# Patient Record
Sex: FEMALE | Race: WHITE | NOT HISPANIC OR LATINO | Employment: OTHER | URBAN - METROPOLITAN AREA
[De-identification: names, ages, dates, MRNs, and addresses within clinical notes are randomized per-mention and may not be internally consistent; named-entity substitution may affect disease eponyms.]

---

## 2023-09-13 PROBLEM — F32.A DEPRESSED: Status: ACTIVE | Noted: 2023-09-13

## 2023-09-13 PROBLEM — R26.81 UNSTEADY GAIT: Status: ACTIVE | Noted: 2023-09-13

## 2023-09-13 PROBLEM — N39.41 URGE INCONTINENCE OF URINE: Status: ACTIVE | Noted: 2023-09-13

## 2023-09-13 PROBLEM — I05.9 MITRAL VALVE DISORDER: Status: ACTIVE | Noted: 2023-09-13

## 2023-09-13 PROBLEM — G47.00 INSOMNIA: Status: ACTIVE | Noted: 2023-09-13

## 2023-09-13 PROBLEM — G47.30 SLEEP APNEA: Status: ACTIVE | Noted: 2023-09-13

## 2023-09-13 PROBLEM — F41.9 ANXIETY: Status: ACTIVE | Noted: 2023-09-13

## 2023-09-13 PROBLEM — R35.0 FREQUENCY OF MICTURITION: Status: ACTIVE | Noted: 2023-09-13

## 2023-09-13 PROBLEM — R32 INCONTINENCE: Status: ACTIVE | Noted: 2023-09-13

## 2023-09-13 PROBLEM — R76.8 POSITIVE ANA (ANTINUCLEAR ANTIBODY): Status: ACTIVE | Noted: 2023-09-13

## 2023-09-13 PROBLEM — E53.8 VITAMIN B12 DEFICIENCY: Status: ACTIVE | Noted: 2023-09-13

## 2023-09-13 PROBLEM — E11.65 HYPERGLYCEMIA DUE TO TYPE 2 DIABETES MELLITUS (MULTI): Status: ACTIVE | Noted: 2023-09-13

## 2023-09-13 PROBLEM — E78.5 HYPERLIPIDEMIA: Status: ACTIVE | Noted: 2023-09-13

## 2023-09-13 PROBLEM — I10 HYPERTENSION: Status: ACTIVE | Noted: 2023-09-13

## 2023-09-13 PROBLEM — E11.9 DIABETES MELLITUS WITHOUT COMPLICATION (MULTI): Status: ACTIVE | Noted: 2023-09-13

## 2023-09-13 PROBLEM — E03.9 HYPOTHYROID: Status: ACTIVE | Noted: 2023-09-13

## 2023-09-13 PROBLEM — N20.1 URETERAL STONE: Status: ACTIVE | Noted: 2023-09-13

## 2023-09-13 PROBLEM — N20.0 KIDNEY STONE: Status: ACTIVE | Noted: 2023-09-13

## 2023-09-13 PROBLEM — R51.9 NEW ONSET HEADACHE: Status: ACTIVE | Noted: 2023-09-13

## 2023-09-13 PROBLEM — I48.0 PAROXYSMAL ATRIAL FIBRILLATION (MULTI): Status: ACTIVE | Noted: 2023-09-13

## 2023-09-13 PROBLEM — E55.9 VITAMIN D DEFICIENCY: Status: ACTIVE | Noted: 2023-09-13

## 2023-09-13 PROBLEM — R51.9 DAILY HEADACHE: Status: ACTIVE | Noted: 2023-09-13

## 2023-09-13 RX ORDER — SOTALOL HYDROCHLORIDE 80 MG/1
1 TABLET ORAL 2 TIMES DAILY
COMMUNITY
End: 2024-04-18

## 2023-09-13 RX ORDER — ONDANSETRON 4 MG/1
1 TABLET, ORALLY DISINTEGRATING ORAL EVERY 6 HOURS PRN
COMMUNITY
End: 2023-11-30 | Stop reason: ALTCHOICE

## 2023-09-13 RX ORDER — OXYCODONE AND ACETAMINOPHEN 5; 325 MG/1; MG/1
1 TABLET ORAL EVERY 4 HOURS PRN
COMMUNITY
End: 2023-11-30 | Stop reason: ALTCHOICE

## 2023-09-13 RX ORDER — VIBEGRON 75 MG/1
1 TABLET, FILM COATED ORAL DAILY
COMMUNITY
Start: 2022-08-11

## 2023-09-13 RX ORDER — SERTRALINE HYDROCHLORIDE 50 MG/1
1 TABLET, FILM COATED ORAL DAILY
COMMUNITY

## 2023-09-13 RX ORDER — BUPROPION HYDROCHLORIDE 150 MG/1
1 TABLET ORAL EVERY MORNING
COMMUNITY
Start: 2017-10-25

## 2023-09-13 RX ORDER — ERGOCALCIFEROL 1.25 MG/1
1 CAPSULE ORAL
COMMUNITY
Start: 2019-04-30

## 2023-09-13 RX ORDER — TRAZODONE HYDROCHLORIDE 100 MG/1
TABLET ORAL
COMMUNITY
End: 2024-02-14

## 2023-09-13 RX ORDER — LEVOTHYROXINE SODIUM 75 UG/1
75 TABLET ORAL DAILY
COMMUNITY
End: 2023-11-30 | Stop reason: SDUPTHER

## 2023-09-13 RX ORDER — MELOXICAM 7.5 MG/1
1 TABLET ORAL DAILY
COMMUNITY

## 2023-09-13 RX ORDER — KETOCONAZOLE 20 MG/G
CREAM TOPICAL
COMMUNITY
Start: 2023-01-03

## 2023-09-13 RX ORDER — WARFARIN 2.5 MG/1
TABLET ORAL
COMMUNITY

## 2023-09-13 RX ORDER — LOSARTAN POTASSIUM 50 MG/1
1 TABLET ORAL DAILY
COMMUNITY
Start: 2023-02-14 | End: 2024-03-07

## 2023-09-13 RX ORDER — LEVOTHYROXINE SODIUM 50 UG/1
1 TABLET ORAL
COMMUNITY
End: 2023-11-30

## 2023-09-13 RX ORDER — IBUPROFEN 600 MG/1
1 TABLET ORAL EVERY 6 HOURS PRN
COMMUNITY
End: 2024-03-13

## 2023-09-13 RX ORDER — GLIPIZIDE 2.5 MG/1
1 TABLET, EXTENDED RELEASE ORAL
COMMUNITY
Start: 2021-03-23 | End: 2023-11-30 | Stop reason: SDUPTHER

## 2023-09-13 RX ORDER — FLUTICASONE PROPIONATE 50 MCG
1 SPRAY, SUSPENSION (ML) NASAL DAILY
COMMUNITY
Start: 2021-09-14 | End: 2024-05-17 | Stop reason: SDUPTHER

## 2023-09-13 RX ORDER — LANCETS
EACH MISCELLANEOUS 2 TIMES DAILY
COMMUNITY
Start: 2019-04-01

## 2023-09-13 RX ORDER — LOSARTAN POTASSIUM 25 MG/1
1 TABLET ORAL DAILY
COMMUNITY
Start: 2021-07-08 | End: 2023-11-30

## 2023-09-13 RX ORDER — LANOLIN ALCOHOL/MO/W.PET/CERES
1 CREAM (GRAM) TOPICAL DAILY
COMMUNITY

## 2023-09-13 RX ORDER — WARFARIN SODIUM 5 MG/1
1 TABLET ORAL DAILY
COMMUNITY

## 2023-10-05 ENCOUNTER — ANTICOAGULATION - WARFARIN VISIT (OUTPATIENT)
Dept: CARDIOLOGY | Facility: CLINIC | Age: 73
End: 2023-10-05
Payer: MEDICARE

## 2023-10-05 DIAGNOSIS — T50.901A ACCIDENTAL MEDICATION ERROR, INITIAL ENCOUNTER: ICD-10-CM

## 2023-10-05 LAB
INR IN PPP BY COAGULATION ASSAY EXTERNAL: 2.2 (ref 2–3)
PROTHROMBIN TIME (PT) IN PPP BY COAGULATION ASSAY EXTERNAL: ABNORMAL SECONDS

## 2023-11-02 ENCOUNTER — APPOINTMENT (OUTPATIENT)
Dept: PRIMARY CARE | Facility: CLINIC | Age: 73
End: 2023-11-02
Payer: MEDICARE

## 2023-11-02 ENCOUNTER — TELEPHONE (OUTPATIENT)
Dept: CARDIOLOGY | Facility: CLINIC | Age: 73
End: 2023-11-02
Payer: MEDICARE

## 2023-11-02 ENCOUNTER — ANTICOAGULATION - WARFARIN VISIT (OUTPATIENT)
Dept: CARDIOLOGY | Facility: HOSPITAL | Age: 73
End: 2023-11-02
Payer: MEDICARE

## 2023-11-02 NOTE — TELEPHONE ENCOUNTER
Patient called asking if she should continue coumadin dose at 5mg for Sun, Tue, Wed, Fri, Sat and 2.5mg Mon and thurs.

## 2023-11-15 ENCOUNTER — TELEPHONE (OUTPATIENT)
Dept: CARDIOLOGY | Facility: CLINIC | Age: 73
End: 2023-11-15

## 2023-11-15 ENCOUNTER — ANTICOAGULATION - WARFARIN VISIT (OUTPATIENT)
Dept: CARDIOLOGY | Facility: CLINIC | Age: 73
End: 2023-11-15
Payer: MEDICARE

## 2023-11-15 DIAGNOSIS — I48.0 PAROXYSMAL ATRIAL FIBRILLATION (MULTI): ICD-10-CM

## 2023-11-15 LAB
POC INR: 2.4
POC PROTHROMBIN TIME: NORMAL

## 2023-11-16 NOTE — TELEPHONE ENCOUNTER
Patient called to find out her instructions for warfarin. Advised no change, recheck in 2 weeks with home monitor.

## 2023-11-30 ENCOUNTER — ANTICOAGULATION - WARFARIN VISIT (OUTPATIENT)
Dept: CARDIOLOGY | Facility: CLINIC | Age: 73
End: 2023-11-30
Payer: MEDICARE

## 2023-11-30 ENCOUNTER — OFFICE VISIT (OUTPATIENT)
Dept: PRIMARY CARE | Facility: CLINIC | Age: 73
End: 2023-11-30
Payer: MEDICARE

## 2023-11-30 VITALS
DIASTOLIC BLOOD PRESSURE: 78 MMHG | OXYGEN SATURATION: 97 % | TEMPERATURE: 97.7 F | BODY MASS INDEX: 29.77 KG/M2 | RESPIRATION RATE: 16 BRPM | WEIGHT: 168 LBS | HEIGHT: 63 IN | SYSTOLIC BLOOD PRESSURE: 124 MMHG | HEART RATE: 59 BPM

## 2023-11-30 DIAGNOSIS — E55.9 VITAMIN D DEFICIENCY: ICD-10-CM

## 2023-11-30 DIAGNOSIS — E11.9 DIABETES MELLITUS WITHOUT COMPLICATION (MULTI): ICD-10-CM

## 2023-11-30 DIAGNOSIS — Z00.00 WELL ADULT EXAM: Primary | ICD-10-CM

## 2023-11-30 DIAGNOSIS — E11.65 TYPE 2 DIABETES MELLITUS WITH HYPERGLYCEMIA, WITHOUT LONG-TERM CURRENT USE OF INSULIN (MULTI): ICD-10-CM

## 2023-11-30 DIAGNOSIS — E03.9 HYPOTHYROIDISM, UNSPECIFIED TYPE: ICD-10-CM

## 2023-11-30 DIAGNOSIS — I48.0 PAROXYSMAL ATRIAL FIBRILLATION (MULTI): ICD-10-CM

## 2023-11-30 DIAGNOSIS — I10 HYPERTENSION, UNSPECIFIED TYPE: ICD-10-CM

## 2023-11-30 DIAGNOSIS — R53.83 OTHER FATIGUE: ICD-10-CM

## 2023-11-30 DIAGNOSIS — L85.3 DRY SKIN: ICD-10-CM

## 2023-11-30 DIAGNOSIS — Z13.220 SCREENING FOR LIPID DISORDERS: ICD-10-CM

## 2023-11-30 DIAGNOSIS — G62.9 NEUROPATHY: ICD-10-CM

## 2023-11-30 LAB
25(OH)D3 SERPL-MCNC: 67 NG/ML (ref 31–100)
ALBUMIN SERPL-MCNC: 4.2 G/DL (ref 3.5–5)
ALP BLD-CCNC: 94 U/L (ref 35–125)
ALT SERPL-CCNC: 10 U/L (ref 5–40)
ANION GAP SERPL CALC-SCNC: 10 MMOL/L
AST SERPL-CCNC: 14 U/L (ref 5–40)
BASOPHILS # BLD AUTO: 0.03 X10*3/UL (ref 0–0.1)
BASOPHILS NFR BLD AUTO: 0.5 %
BILIRUB SERPL-MCNC: 0.4 MG/DL (ref 0.1–1.2)
BUN SERPL-MCNC: 16 MG/DL (ref 8–25)
CALCIUM SERPL-MCNC: 8.9 MG/DL (ref 8.5–10.4)
CHLORIDE SERPL-SCNC: 100 MMOL/L (ref 97–107)
CHOLEST SERPL-MCNC: 218 MG/DL (ref 133–200)
CHOLEST/HDLC SERPL: 2.7 {RATIO}
CO2 SERPL-SCNC: 28 MMOL/L (ref 24–31)
CREAT SERPL-MCNC: 0.8 MG/DL (ref 0.4–1.6)
EOSINOPHIL # BLD AUTO: 0.08 X10*3/UL (ref 0–0.4)
EOSINOPHIL NFR BLD AUTO: 1.4 %
ERYTHROCYTE [DISTWIDTH] IN BLOOD BY AUTOMATED COUNT: 14.3 % (ref 11.5–14.5)
GFR SERPL CREATININE-BSD FRML MDRD: 77 ML/MIN/1.73M*2
GLUCOSE SERPL-MCNC: 94 MG/DL (ref 65–99)
HCT VFR BLD AUTO: 40.5 % (ref 36–46)
HDLC SERPL-MCNC: 80 MG/DL
HGB BLD-MCNC: 13.1 G/DL (ref 12–16)
IMM GRANULOCYTES # BLD AUTO: 0.01 X10*3/UL (ref 0–0.5)
IMM GRANULOCYTES NFR BLD AUTO: 0.2 % (ref 0–0.9)
LDLC SERPL CALC-MCNC: 114 MG/DL (ref 65–130)
LYMPHOCYTES # BLD AUTO: 1.56 X10*3/UL (ref 0.8–3)
LYMPHOCYTES NFR BLD AUTO: 27.5 %
MCH RBC QN AUTO: 30.5 PG (ref 26–34)
MCHC RBC AUTO-ENTMCNC: 32.3 G/DL (ref 32–36)
MCV RBC AUTO: 94 FL (ref 80–100)
MONOCYTES # BLD AUTO: 0.31 X10*3/UL (ref 0.05–0.8)
MONOCYTES NFR BLD AUTO: 5.5 %
NEUTROPHILS # BLD AUTO: 3.68 X10*3/UL (ref 1.6–5.5)
NEUTROPHILS NFR BLD AUTO: 64.9 %
NRBC BLD-RTO: 0 /100 WBCS (ref 0–0)
PLATELET # BLD AUTO: 308 X10*3/UL (ref 150–450)
POC HEMOGLOBIN A1C: 6.1 % (ref 4.2–6.5)
POC INR: 2
POC PROTHROMBIN TIME: NORMAL
POTASSIUM SERPL-SCNC: 4.3 MMOL/L (ref 3.4–5.1)
PROT SERPL-MCNC: 6.7 G/DL (ref 5.9–7.9)
RBC # BLD AUTO: 4.29 X10*6/UL (ref 4–5.2)
SODIUM SERPL-SCNC: 138 MMOL/L (ref 133–145)
TRIGL SERPL-MCNC: 121 MG/DL (ref 40–150)
TSH SERPL DL<=0.05 MIU/L-ACNC: 1.78 MIU/L (ref 0.27–4.2)
WBC # BLD AUTO: 5.7 X10*3/UL (ref 4.4–11.3)

## 2023-11-30 PROCEDURE — 99397 PER PM REEVAL EST PAT 65+ YR: CPT | Performed by: NURSE PRACTITIONER

## 2023-11-30 PROCEDURE — 82306 VITAMIN D 25 HYDROXY: CPT | Performed by: NURSE PRACTITIONER

## 2023-11-30 PROCEDURE — 1125F AMNT PAIN NOTED PAIN PRSNT: CPT | Performed by: NURSE PRACTITIONER

## 2023-11-30 PROCEDURE — 4010F ACE/ARB THERAPY RXD/TAKEN: CPT | Performed by: NURSE PRACTITIONER

## 2023-11-30 PROCEDURE — 1159F MED LIST DOCD IN RCRD: CPT | Performed by: NURSE PRACTITIONER

## 2023-11-30 PROCEDURE — 80061 LIPID PANEL: CPT | Performed by: NURSE PRACTITIONER

## 2023-11-30 PROCEDURE — 85025 COMPLETE CBC W/AUTO DIFF WBC: CPT | Performed by: NURSE PRACTITIONER

## 2023-11-30 PROCEDURE — 84443 ASSAY THYROID STIM HORMONE: CPT | Performed by: NURSE PRACTITIONER

## 2023-11-30 PROCEDURE — 80053 COMPREHEN METABOLIC PANEL: CPT | Performed by: NURSE PRACTITIONER

## 2023-11-30 PROCEDURE — 3074F SYST BP LT 130 MM HG: CPT | Performed by: NURSE PRACTITIONER

## 2023-11-30 PROCEDURE — 36415 COLL VENOUS BLD VENIPUNCTURE: CPT | Performed by: NURSE PRACTITIONER

## 2023-11-30 PROCEDURE — 3078F DIAST BP <80 MM HG: CPT | Performed by: NURSE PRACTITIONER

## 2023-11-30 PROCEDURE — 1036F TOBACCO NON-USER: CPT | Performed by: NURSE PRACTITIONER

## 2023-11-30 RX ORDER — LEVOTHYROXINE SODIUM 75 UG/1
75 TABLET ORAL DAILY
Qty: 90 TABLET | Refills: 1 | Status: SHIPPED | OUTPATIENT
Start: 2023-11-30 | End: 2024-05-23

## 2023-11-30 RX ORDER — GLIPIZIDE 2.5 MG/1
2.5 TABLET, EXTENDED RELEASE ORAL
Qty: 90 TABLET | Refills: 1 | Status: SHIPPED | OUTPATIENT
Start: 2023-11-30

## 2023-11-30 RX ORDER — AMMONIUM LACTATE 12 G/100G
LOTION TOPICAL AS NEEDED
Qty: 225 G | Refills: 1 | Status: SHIPPED | OUTPATIENT
Start: 2023-11-30 | End: 2024-11-29

## 2023-11-30 RX ORDER — BLOOD SUGAR DIAGNOSTIC
180 STRIP MISCELLANEOUS 2 TIMES DAILY
Qty: 180 STRIP | Refills: 3 | Status: SHIPPED | OUTPATIENT
Start: 2023-11-30

## 2023-11-30 RX ORDER — BLOOD SUGAR DIAGNOSTIC
STRIP MISCELLANEOUS 2 TIMES DAILY
COMMUNITY
End: 2023-11-30 | Stop reason: SDUPTHER

## 2023-11-30 ASSESSMENT — PATIENT HEALTH QUESTIONNAIRE - PHQ9
SUM OF ALL RESPONSES TO PHQ9 QUESTIONS 1 AND 2: 0
2. FEELING DOWN, DEPRESSED OR HOPELESS: NOT AT ALL
1. LITTLE INTEREST OR PLEASURE IN DOING THINGS: NOT AT ALL

## 2023-11-30 ASSESSMENT — PAIN SCALES - GENERAL: PAINLEVEL: 2

## 2023-11-30 NOTE — PATIENT INSTRUCTIONS
Discussed new onst of neuropathy not daily intermittent sai call if worsening . Discussed treatment  decline at this time dm wel controlled

## 2023-11-30 NOTE — PROGRESS NOTES
"Subjective   Patient ID: Sheron Meyers is a 73 y.o. female who presents for No chief complaint on file.. Here for a cpe and dm vist    Here for a cp  . And dm vist. Has had cataracts done,  using CPAP has some facial swelling due to mask. Overall doing well         Review of Systems   All other systems reviewed and are negative.      Objective   /78   Pulse 59   Temp 36.5 °C (97.7 °F)   Resp 16   Ht 1.588 m (5' 2.5\")   Wt 76.2 kg (168 lb)   SpO2 97%   BMI 30.24 kg/m²     Physical Exam  Constitutional:       Appearance: Normal appearance. She is normal weight.   HENT:      Head: Normocephalic.      Right Ear: Tympanic membrane normal.      Left Ear: Tympanic membrane normal.      Nose: Nose normal.   Cardiovascular:      Rate and Rhythm: Normal rate and regular rhythm.      Pulses:           Dorsalis pedis pulses are 1+ on the right side and 1+ on the left side.      Heart sounds: Normal heart sounds.   Pulmonary:      Effort: Pulmonary effort is normal.   Genitourinary:     Comments: Breast exam nl  Musculoskeletal:         General: Normal range of motion.      Cervical back: Normal range of motion.   Feet:      Right foot:      Skin integrity: Skin integrity normal.      Toenail Condition: Right toenails are normal.      Left foot:      Skin integrity: Skin integrity normal.      Toenail Condition: Left toenails are normal.   Skin:     General: Skin is dry.      Comments: Obvious scratching   Neurological:      Mental Status: She is alert.      Comments: Has some new symptomms of walking on needles on feet new onset   Psychiatric:         Mood and Affect: Mood normal.         Assessment/Plan   Problem List Items Addressed This Visit             ICD-10-CM    Diabetes mellitus without complication (CMS/HCC) E11.9    Relevant Orders    POCT glycosylated hemoglobin (Hb A1C) manually resulted (Completed)    Hyperglycemia due to type 2 diabetes mellitus (CMS/HCC) E11.65     Other Visit Diagnoses         " Codes    Well adult exam    -  Primary Z00.00

## 2023-12-13 ENCOUNTER — ANTICOAGULATION - WARFARIN VISIT (OUTPATIENT)
Dept: CARDIOLOGY | Facility: CLINIC | Age: 73
End: 2023-12-13
Payer: MEDICARE

## 2023-12-13 DIAGNOSIS — I48.0 PAROXYSMAL ATRIAL FIBRILLATION (MULTI): ICD-10-CM

## 2023-12-13 LAB
POC INR: 2.6
POC PROTHROMBIN TIME: NORMAL

## 2024-01-12 ENCOUNTER — TELEPHONE (OUTPATIENT)
Dept: CARDIOLOGY | Facility: CLINIC | Age: 74
End: 2024-01-12
Payer: MEDICARE

## 2024-01-24 ENCOUNTER — TELEPHONE (OUTPATIENT)
Dept: CARDIOLOGY | Facility: CLINIC | Age: 74
End: 2024-01-24
Payer: MEDICARE

## 2024-02-06 ENCOUNTER — ANTICOAGULATION - WARFARIN VISIT (OUTPATIENT)
Dept: CARDIOLOGY | Facility: CLINIC | Age: 74
End: 2024-02-06
Payer: MEDICARE

## 2024-02-06 DIAGNOSIS — I48.0 PAROXYSMAL ATRIAL FIBRILLATION (MULTI): Primary | ICD-10-CM

## 2024-02-06 LAB
POC INR: 2
POC PROTHROMBIN TIME: NORMAL

## 2024-02-06 PROCEDURE — 85610 PROTHROMBIN TIME: CPT | Mod: QW | Performed by: INTERNAL MEDICINE

## 2024-02-06 PROCEDURE — 85610 PROTHROMBIN TIME: CPT | Performed by: INTERNAL MEDICINE

## 2024-02-07 ENCOUNTER — TELEPHONE (OUTPATIENT)
Dept: CARDIOLOGY | Facility: CLINIC | Age: 74
End: 2024-02-07
Payer: MEDICARE

## 2024-02-12 DIAGNOSIS — F32.A DEPRESSION, UNSPECIFIED DEPRESSION TYPE: ICD-10-CM

## 2024-02-14 RX ORDER — TRAZODONE HYDROCHLORIDE 100 MG/1
100 TABLET ORAL DAILY
Qty: 90 TABLET | Refills: 0 | Status: SHIPPED | OUTPATIENT
Start: 2024-02-14 | End: 2024-05-23

## 2024-02-21 ENCOUNTER — TELEPHONE (OUTPATIENT)
Dept: CARDIOLOGY | Facility: CLINIC | Age: 74
End: 2024-02-21
Payer: MEDICARE

## 2024-03-06 ENCOUNTER — TELEPHONE (OUTPATIENT)
Dept: CARDIOLOGY | Facility: CLINIC | Age: 74
End: 2024-03-06
Payer: MEDICARE

## 2024-03-07 DIAGNOSIS — I10 HYPERTENSION, UNSPECIFIED TYPE: ICD-10-CM

## 2024-03-07 RX ORDER — LOSARTAN POTASSIUM 50 MG/1
50 TABLET ORAL DAILY
Qty: 90 TABLET | Refills: 3 | Status: SHIPPED | OUTPATIENT
Start: 2024-03-07 | End: 2024-03-13 | Stop reason: DRUGHIGH

## 2024-03-13 ENCOUNTER — OFFICE VISIT (OUTPATIENT)
Dept: CARDIOLOGY | Facility: CLINIC | Age: 74
End: 2024-03-13
Payer: MEDICARE

## 2024-03-13 VITALS — SYSTOLIC BLOOD PRESSURE: 168 MMHG | DIASTOLIC BLOOD PRESSURE: 94 MMHG | HEART RATE: 68 BPM

## 2024-03-13 DIAGNOSIS — E78.2 MIXED HYPERLIPIDEMIA: ICD-10-CM

## 2024-03-13 DIAGNOSIS — I48.0 PAROXYSMAL ATRIAL FIBRILLATION (MULTI): Primary | ICD-10-CM

## 2024-03-13 DIAGNOSIS — I10 PRIMARY HYPERTENSION: ICD-10-CM

## 2024-03-13 PROCEDURE — 3080F DIAST BP >= 90 MM HG: CPT | Performed by: INTERNAL MEDICINE

## 2024-03-13 PROCEDURE — 1159F MED LIST DOCD IN RCRD: CPT | Performed by: INTERNAL MEDICINE

## 2024-03-13 PROCEDURE — 3077F SYST BP >= 140 MM HG: CPT | Performed by: INTERNAL MEDICINE

## 2024-03-13 PROCEDURE — 99214 OFFICE O/P EST MOD 30 MIN: CPT | Performed by: INTERNAL MEDICINE

## 2024-03-13 PROCEDURE — 1036F TOBACCO NON-USER: CPT | Performed by: INTERNAL MEDICINE

## 2024-03-13 PROCEDURE — 4010F ACE/ARB THERAPY RXD/TAKEN: CPT | Performed by: INTERNAL MEDICINE

## 2024-03-13 PROCEDURE — 1125F AMNT PAIN NOTED PAIN PRSNT: CPT | Performed by: INTERNAL MEDICINE

## 2024-03-13 RX ORDER — LOSARTAN POTASSIUM 100 MG/1
100 TABLET ORAL DAILY
Qty: 90 TABLET | Refills: 3 | Status: SHIPPED | OUTPATIENT
Start: 2024-03-13 | End: 2025-03-13

## 2024-03-13 ASSESSMENT — PAIN SCALES - GENERAL: PAINLEVEL: 3

## 2024-03-13 ASSESSMENT — ENCOUNTER SYMPTOMS
PALPITATIONS: 1
SHORTNESS OF BREATH: 0
NUMBNESS: 0
HEMATURIA: 0
BLURRED VISION: 0
DYSURIA: 0
DYSPNEA ON EXERTION: 0
COUGH: 0
ABDOMINAL PAIN: 0
PARESTHESIAS: 0

## 2024-03-13 NOTE — PROGRESS NOTES
Subjective   Sheron Meyers is a 74 y.o. female.    Chief Complaint:  Atrial Fibrillation (6 month f/u)    Atrial Fibrillation  Symptoms include palpitations. Symptoms are negative for chest pain and shortness of breath. Past medical history includes atrial fibrillation.   Started CPAP in October and palpitations have increased at night.  Not during the day.      Review of Systems   Constitutional: Negative for malaise/fatigue.   HENT:  Negative for congestion.    Eyes:  Negative for blurred vision.   Cardiovascular:  Positive for palpitations. Negative for chest pain and dyspnea on exertion.   Respiratory:  Negative for cough and shortness of breath.    Musculoskeletal:  Negative for joint pain.   Gastrointestinal:  Negative for abdominal pain.   Genitourinary:  Negative for dysuria and hematuria.   Neurological:  Negative for numbness and paresthesias.       Objective   Constitutional:       Appearance: Not in distress.   Eyes:      Conjunctiva/sclera: Conjunctivae normal.   Neck:      Vascular: JVD normal.   Pulmonary:      Breath sounds: Normal breath sounds. No wheezing. No rhonchi. No rales.   Cardiovascular:      Normal rate. Regular rhythm.      Murmurs: There is no murmur.      No gallop.  No click. No rub.   Abdominal:      Palpations: Abdomen is soft.   Neurological:      General: No focal deficit present.      Mental Status: Alert.         Lab Review:   Anticoagulation - Warfarin Visit on 02/06/2024   Component Date Value    POC INR 02/06/2024 2.00        Assessment/Plan   The primary encounter diagnosis was Paroxysmal atrial fibrillation (CMS/HCC). Diagnoses of Primary hypertension and Mixed hyperlipidemia were also pertinent to this visit.    Hyperlipidemia  Yenni Delgado has followed lipids, last panel: Tchol 218  HDL 80     Paroxysmal atrial fibrillation (CMS/HCC)  Sotalol for suppression of afib and warfarin for stroke prevention, we follow the INRs.  Palpitations have increased during  the evening hours when she is using her CPAP.  The question will be what is her heart rhythm when she feels the palpitations.  Will order to a patch monitor to see if we can determine what the cardiac rhythm is at the time of her symptoms.    Hypertension  BP elevated will increase the losartan to 100  mg.  Home monitor relatively accurate, will have her keep a home BP log and bring with her on return.

## 2024-03-13 NOTE — ASSESSMENT & PLAN NOTE
BP elevated will increase the losartan to 100  mg.  Home monitor relatively accurate, will have her keep a home BP log and bring with her on return.

## 2024-03-13 NOTE — ASSESSMENT & PLAN NOTE
Sotalol for suppression of afib and warfarin for stroke prevention, we follow the INRs.  Palpitations have increased during the evening hours when she is using her CPAP.  The question will be what is her heart rhythm when she feels the palpitations.  Will order to a patch monitor to see if we can determine what the cardiac rhythm is at the time of her symptoms.

## 2024-03-20 ENCOUNTER — TELEPHONE (OUTPATIENT)
Dept: CARDIOLOGY | Facility: CLINIC | Age: 74
End: 2024-03-20
Payer: MEDICARE

## 2024-04-04 ENCOUNTER — TELEPHONE (OUTPATIENT)
Dept: CARDIOLOGY | Facility: CLINIC | Age: 74
End: 2024-04-04

## 2024-04-04 ENCOUNTER — ANTICOAGULATION - WARFARIN VISIT (OUTPATIENT)
Dept: CARDIOLOGY | Facility: CLINIC | Age: 74
End: 2024-04-04
Payer: MEDICARE

## 2024-04-04 DIAGNOSIS — I48.0 PAROXYSMAL ATRIAL FIBRILLATION (MULTI): Primary | ICD-10-CM

## 2024-04-04 LAB
POC INR: 3
POC PROTHROMBIN TIME: NORMAL

## 2024-04-04 PROCEDURE — 85610 PROTHROMBIN TIME: CPT | Performed by: INTERNAL MEDICINE

## 2024-04-04 PROCEDURE — 85610 PROTHROMBIN TIME: CPT | Mod: QW | Performed by: INTERNAL MEDICINE

## 2024-04-04 NOTE — PROGRESS NOTES
INR 3.0 will have patient hold coumadin 1 day then resume 7.5 mg M-TH 5 mg other days.  Recheck 2 weeks

## 2024-04-18 DIAGNOSIS — I48.0 PAROXYSMAL ATRIAL FIBRILLATION (MULTI): Primary | ICD-10-CM

## 2024-04-18 RX ORDER — SOTALOL HYDROCHLORIDE 80 MG/1
80 TABLET ORAL 2 TIMES DAILY
Qty: 180 TABLET | Refills: 0 | Status: SHIPPED | OUTPATIENT
Start: 2024-04-18

## 2024-04-22 ENCOUNTER — TELEPHONE (OUTPATIENT)
Dept: CARDIOLOGY | Facility: CLINIC | Age: 74
End: 2024-04-22

## 2024-04-26 NOTE — ASSESSMENT & PLAN NOTE
Yenni Delgado checked lipids in November: Tchol 218  HDL 80 .  Reasonable goal numbers for the patient's risk profile, follow over time.

## 2024-04-26 NOTE — ASSESSMENT & PLAN NOTE
BP mildly elevated on last visit and we increased the Losartan to 100 mg and I asked her to keep home BP log.  Home blood pressure overall mildly elevated.  I will add hydrochlorothiazide 25 mg once daily to her losartan.  Check a basic metabolic panel in approximately 4 weeks call if necessary.

## 2024-04-26 NOTE — ASSESSMENT & PLAN NOTE
Patient was experiencing palpitations on last visit and we ordered patch monitor.  Sotalol had been relatively effective up to this point.  Did not see any atrial fibrillation on the recent Zio patch monitor.  Continue warfarin for stroke prevention and sotalol to suppress the atrial fibrillation.  Will follow  The INRs

## 2024-04-30 ENCOUNTER — OFFICE VISIT (OUTPATIENT)
Dept: CARDIOLOGY | Facility: CLINIC | Age: 74
End: 2024-04-30
Payer: MEDICARE

## 2024-04-30 VITALS
HEART RATE: 64 BPM | BODY MASS INDEX: 31.32 KG/M2 | WEIGHT: 174 LBS | DIASTOLIC BLOOD PRESSURE: 80 MMHG | SYSTOLIC BLOOD PRESSURE: 142 MMHG

## 2024-04-30 DIAGNOSIS — I48.0 PAROXYSMAL ATRIAL FIBRILLATION (MULTI): Primary | ICD-10-CM

## 2024-04-30 DIAGNOSIS — E11.9 DIABETES MELLITUS WITHOUT COMPLICATION (MULTI): Primary | ICD-10-CM

## 2024-04-30 DIAGNOSIS — I47.10 SVT (SUPRAVENTRICULAR TACHYCARDIA) (CMS-HCC): ICD-10-CM

## 2024-04-30 DIAGNOSIS — E78.2 MIXED HYPERLIPIDEMIA: ICD-10-CM

## 2024-04-30 DIAGNOSIS — I10 PRIMARY HYPERTENSION: ICD-10-CM

## 2024-04-30 PROCEDURE — 99214 OFFICE O/P EST MOD 30 MIN: CPT | Performed by: INTERNAL MEDICINE

## 2024-04-30 PROCEDURE — 3079F DIAST BP 80-89 MM HG: CPT | Performed by: INTERNAL MEDICINE

## 2024-04-30 PROCEDURE — 93005 ELECTROCARDIOGRAM TRACING: CPT | Performed by: INTERNAL MEDICINE

## 2024-04-30 PROCEDURE — 3077F SYST BP >= 140 MM HG: CPT | Performed by: INTERNAL MEDICINE

## 2024-04-30 PROCEDURE — 4010F ACE/ARB THERAPY RXD/TAKEN: CPT | Performed by: INTERNAL MEDICINE

## 2024-04-30 PROCEDURE — 1126F AMNT PAIN NOTED NONE PRSNT: CPT | Performed by: INTERNAL MEDICINE

## 2024-04-30 PROCEDURE — 93010 ELECTROCARDIOGRAM REPORT: CPT | Performed by: INTERNAL MEDICINE

## 2024-04-30 PROCEDURE — 1159F MED LIST DOCD IN RCRD: CPT | Performed by: INTERNAL MEDICINE

## 2024-04-30 PROCEDURE — 1036F TOBACCO NON-USER: CPT | Performed by: INTERNAL MEDICINE

## 2024-04-30 RX ORDER — ATORVASTATIN CALCIUM 20 MG/1
20 TABLET, FILM COATED ORAL DAILY
Qty: 100 TABLET | Refills: 3 | Status: SHIPPED | OUTPATIENT
Start: 2024-04-30 | End: 2025-06-04

## 2024-04-30 RX ORDER — HYDROCHLOROTHIAZIDE 25 MG/1
25 TABLET ORAL DAILY
Qty: 90 TABLET | Refills: 3 | Status: SHIPPED | OUTPATIENT
Start: 2024-04-30 | End: 2025-04-30

## 2024-04-30 ASSESSMENT — ENCOUNTER SYMPTOMS
COUGH: 0
LOSS OF SENSATION IN FEET: 0
PARESTHESIAS: 0
ABDOMINAL PAIN: 0
DYSURIA: 0
OCCASIONAL FEELINGS OF UNSTEADINESS: 1
BLURRED VISION: 0
PALPITATIONS: 1
DEPRESSION: 0
HEMATURIA: 0
NUMBNESS: 0
DYSPNEA ON EXERTION: 0
SHORTNESS OF BREATH: 0

## 2024-04-30 ASSESSMENT — PATIENT HEALTH QUESTIONNAIRE - PHQ9
SUM OF ALL RESPONSES TO PHQ9 QUESTIONS 1 AND 2: 0
1. LITTLE INTEREST OR PLEASURE IN DOING THINGS: NOT AT ALL
2. FEELING DOWN, DEPRESSED OR HOPELESS: NOT AT ALL

## 2024-04-30 ASSESSMENT — PAIN SCALES - GENERAL: PAINLEVEL: 0-NO PAIN

## 2024-04-30 NOTE — ASSESSMENT & PLAN NOTE
Short bursts of SVT noted on Zio patch monitor.  We discussed various treatment options including increasing dose of sotalol, alternative antiarrhythmic, referral to Dr. Souza for bleeding medications the same.  QTc interval was reasonable at 424 ms resting heart rate is only 60 bpm.  Will thus make no changes at this time.  If palpitations persist or become more prominent will likely refer to Dr. Souza for imaging.

## 2024-04-30 NOTE — PROGRESS NOTES
Subjective   Sheron Meyers is a 74 y.o. female.    Chief Complaint:  Follow-up (Monitor results)    HPI  The patient states that she still has the palpitations.  They are not prolonged and usually brief but she still feels.    Review of Systems   Constitutional: Negative for malaise/fatigue.   HENT:  Negative for congestion.    Eyes:  Negative for blurred vision.   Cardiovascular:  Positive for palpitations. Negative for chest pain and dyspnea on exertion.   Respiratory:  Negative for cough and shortness of breath.    Musculoskeletal:  Negative for joint pain.   Gastrointestinal:  Negative for abdominal pain.   Genitourinary:  Negative for dysuria and hematuria.   Neurological:  Negative for numbness and paresthesias.       Objective   Constitutional:       Appearance: Not in distress.   Eyes:      Conjunctiva/sclera: Conjunctivae normal.   Neck:      Vascular: JVD normal.   Pulmonary:      Breath sounds: Normal breath sounds. No wheezing. No rhonchi. No rales.   Cardiovascular:      Normal rate. Regular rhythm.      Murmurs: There is no murmur.      No gallop.  No click. No rub.   Abdominal:      Palpations: Abdomen is soft.   Neurological:      General: No focal deficit present.      Mental Status: Alert.         Lab Review:       Assessment/Plan   The primary encounter diagnosis was Paroxysmal atrial fibrillation (Multi). Diagnoses of Primary hypertension, Mixed hyperlipidemia, and SVT (supraventricular tachycardia) (CMS-HCC) were also pertinent to this visit.  Hyperlipidemia  Yenni Delgado checked lipids in November: Tchol 218  HDL 80 .  Reasonable goal numbers for the patient's risk profile, follow over time.    Hypertension  BP mildly elevated on last visit and we increased the Losartan to 100 mg and I asked her to keep home BP log.  Home blood pressure overall mildly elevated.  I will add hydrochlorothiazide 25 mg once daily to her losartan.  Check a basic metabolic panel in approximately 4 weeks  call if necessary.    Paroxysmal atrial fibrillation (Multi)  Patient was experiencing palpitations on last visit and we ordered patch monitor.  Sotalol had been relatively effective up to this point.  Did not see any atrial fibrillation on the recent Zio patch monitor.  Continue warfarin for stroke prevention and sotalol to suppress the atrial fibrillation.  Will follow  The INRs    SVT (supraventricular tachycardia) (CMS-HCC)  Short bursts of SVT noted on Zio patch monitor.  We discussed various treatment options including increasing dose of sotalol, alternative antiarrhythmic, referral to Dr. Souza for bleeding medications the same.  QTc interval was reasonable at 424 ms resting heart rate is only 60 bpm.  Will thus make no changes at this time.  If palpitations persist or become more prominent will likely refer to Dr. Souza for imaging.

## 2024-05-01 ENCOUNTER — ANTICOAGULATION - WARFARIN VISIT (OUTPATIENT)
Dept: CARDIOLOGY | Facility: CLINIC | Age: 74
End: 2024-05-01
Payer: MEDICARE

## 2024-05-01 DIAGNOSIS — I48.0 PAROXYSMAL ATRIAL FIBRILLATION (MULTI): ICD-10-CM

## 2024-05-01 LAB
POC INR: 2.9
POC PROTHROMBIN TIME: NORMAL

## 2024-05-01 NOTE — PROGRESS NOTES
Fax received - pt's INR today was 2.9. Called pt - currently taking 2.5 mg on M, W, F and 5 mg all other days. [phone]

## 2024-05-16 ENCOUNTER — TELEPHONE (OUTPATIENT)
Dept: CARDIOLOGY | Facility: CLINIC | Age: 74
End: 2024-05-16

## 2024-05-16 ENCOUNTER — TELEPHONE (OUTPATIENT)
Dept: PRIMARY CARE | Facility: CLINIC | Age: 74
End: 2024-05-16
Payer: MEDICARE

## 2024-05-16 DIAGNOSIS — J30.89 SEASONAL ALLERGIC RHINITIS DUE TO OTHER ALLERGIC TRIGGER: Primary | ICD-10-CM

## 2024-05-17 ENCOUNTER — ANTICOAGULATION - WARFARIN VISIT (OUTPATIENT)
Dept: CARDIOLOGY | Facility: HOSPITAL | Age: 74
End: 2024-05-17
Payer: MEDICARE

## 2024-05-17 DIAGNOSIS — I48.0 PAROXYSMAL ATRIAL FIBRILLATION (MULTI): Primary | ICD-10-CM

## 2024-05-17 LAB
POC INR: 3.3
POC PROTHROMBIN TIME: NORMAL

## 2024-05-17 RX ORDER — FLUTICASONE PROPIONATE 50 MCG
1 SPRAY, SUSPENSION (ML) NASAL DAILY
Qty: 16 G | Refills: 11 | Status: SHIPPED | OUTPATIENT
Start: 2024-05-17

## 2024-05-23 ENCOUNTER — OFFICE VISIT (OUTPATIENT)
Dept: PRIMARY CARE | Facility: CLINIC | Age: 74
End: 2024-05-23
Payer: MEDICARE

## 2024-05-23 ENCOUNTER — HOSPITAL ENCOUNTER (OUTPATIENT)
Dept: RADIOLOGY | Facility: CLINIC | Age: 74
Discharge: HOME | End: 2024-05-23
Payer: MEDICARE

## 2024-05-23 VITALS
DIASTOLIC BLOOD PRESSURE: 70 MMHG | HEIGHT: 63 IN | SYSTOLIC BLOOD PRESSURE: 110 MMHG | HEART RATE: 64 BPM | WEIGHT: 174 LBS | TEMPERATURE: 97.5 F | BODY MASS INDEX: 30.83 KG/M2 | OXYGEN SATURATION: 96 %

## 2024-05-23 DIAGNOSIS — M25.551 RIGHT HIP PAIN: Primary | ICD-10-CM

## 2024-05-23 DIAGNOSIS — E03.9 HYPOTHYROIDISM, UNSPECIFIED TYPE: Primary | ICD-10-CM

## 2024-05-23 DIAGNOSIS — M25.551 RIGHT HIP PAIN: ICD-10-CM

## 2024-05-23 DIAGNOSIS — F32.A DEPRESSION, UNSPECIFIED DEPRESSION TYPE: ICD-10-CM

## 2024-05-23 PROCEDURE — 73502 X-RAY EXAM HIP UNI 2-3 VIEWS: CPT | Mod: RIGHT SIDE | Performed by: RADIOLOGY

## 2024-05-23 PROCEDURE — 4010F ACE/ARB THERAPY RXD/TAKEN: CPT | Performed by: REGISTERED NURSE

## 2024-05-23 PROCEDURE — 99213 OFFICE O/P EST LOW 20 MIN: CPT | Performed by: REGISTERED NURSE

## 2024-05-23 PROCEDURE — 3074F SYST BP LT 130 MM HG: CPT | Performed by: REGISTERED NURSE

## 2024-05-23 PROCEDURE — 73502 X-RAY EXAM HIP UNI 2-3 VIEWS: CPT | Mod: RT

## 2024-05-23 PROCEDURE — 1125F AMNT PAIN NOTED PAIN PRSNT: CPT | Performed by: REGISTERED NURSE

## 2024-05-23 PROCEDURE — 1160F RVW MEDS BY RX/DR IN RCRD: CPT | Performed by: REGISTERED NURSE

## 2024-05-23 PROCEDURE — 3078F DIAST BP <80 MM HG: CPT | Performed by: REGISTERED NURSE

## 2024-05-23 PROCEDURE — 1036F TOBACCO NON-USER: CPT | Performed by: REGISTERED NURSE

## 2024-05-23 PROCEDURE — 1159F MED LIST DOCD IN RCRD: CPT | Performed by: REGISTERED NURSE

## 2024-05-23 RX ORDER — TRAZODONE HYDROCHLORIDE 100 MG/1
100 TABLET ORAL DAILY
Qty: 90 TABLET | Refills: 0 | Status: SHIPPED | OUTPATIENT
Start: 2024-05-23

## 2024-05-23 RX ORDER — PREDNISONE 20 MG/1
20 TABLET ORAL DAILY
Qty: 5 TABLET | Refills: 0 | Status: SHIPPED | OUTPATIENT
Start: 2024-05-23 | End: 2024-05-28

## 2024-05-23 RX ORDER — LEVOTHYROXINE SODIUM 75 UG/1
75 TABLET ORAL DAILY
Qty: 90 TABLET | Refills: 0 | Status: SHIPPED | OUTPATIENT
Start: 2024-05-23

## 2024-05-23 RX ORDER — CYCLOBENZAPRINE HCL 5 MG
5 TABLET ORAL 3 TIMES DAILY
Qty: 30 TABLET | Refills: 0 | Status: SHIPPED | OUTPATIENT
Start: 2024-05-23 | End: 2024-06-02

## 2024-05-23 ASSESSMENT — ENCOUNTER SYMPTOMS
LOSS OF SENSATION IN FEET: 0
NUMBNESS: 0
OCCASIONAL FEELINGS OF UNSTEADINESS: 0
TINGLING: 0
HIP PAIN: 1
DEPRESSION: 0

## 2024-05-23 ASSESSMENT — PAIN SCALES - GENERAL: PAINLEVEL: 2

## 2024-05-23 ASSESSMENT — COLUMBIA-SUICIDE SEVERITY RATING SCALE - C-SSRS
6. HAVE YOU EVER DONE ANYTHING, STARTED TO DO ANYTHING, OR PREPARED TO DO ANYTHING TO END YOUR LIFE?: NO
2. HAVE YOU ACTUALLY HAD ANY THOUGHTS OF KILLING YOURSELF?: NO
1. IN THE PAST MONTH, HAVE YOU WISHED YOU WERE DEAD OR WISHED YOU COULD GO TO SLEEP AND NOT WAKE UP?: NO

## 2024-05-23 ASSESSMENT — PATIENT HEALTH QUESTIONNAIRE - PHQ9
1. LITTLE INTEREST OR PLEASURE IN DOING THINGS: NOT AT ALL
2. FEELING DOWN, DEPRESSED OR HOPELESS: NOT AT ALL
SUM OF ALL RESPONSES TO PHQ9 QUESTIONS 1 AND 2: 0

## 2024-05-23 NOTE — PROGRESS NOTES
"Subjective   Patient ID: Sheron Meyers is a 74 y.o. female who presents for Hip Pain (Right hip pain x 1 week).    Hip Pain   The incident occurred 5 to 7 days ago. There was no injury mechanism. The pain is present in the right hip. The quality of the pain is described as aching. The pain is at a severity of 5/10. The pain is moderate. The pain has been Intermittent since onset. Pertinent negatives include no numbness or tingling. She reports no foreign bodies present. The symptoms are aggravated by movement and weight bearing. She has tried NSAIDs for the symptoms. The treatment provided no relief.        Review of Systems   Neurological:  Negative for tingling and numbness.   All other systems reviewed and are negative.      Objective   /70 (BP Location: Left arm, Patient Position: Sitting, BP Cuff Size: Adult)   Pulse 64   Temp 36.4 °C (97.5 °F)   Ht 1.588 m (5' 2.5\")   Wt 78.9 kg (174 lb)   SpO2 96%   BMI 31.32 kg/m²     Physical Exam  Vitals reviewed.   Constitutional:       Appearance: Normal appearance.   Musculoskeletal:         General: Tenderness present.   Neurological:      Mental Status: She is alert.   Psychiatric:         Mood and Affect: Mood normal.         Behavior: Behavior normal.      Pt reports pain today is 1/10 and is worse when she climbs stairs or with walking.    Assessment/Plan   Problem List Items Addressed This Visit    None  Visit Diagnoses         Codes    Right hip pain    -  Primary M25.551    Relevant Medications    predniSONE (Deltasone) 20 mg tablet    cyclobenzaprine (Flexeril) 5 mg tablet    Other Relevant Orders    XR hip right with pelvis when performed 2 or 3 views               "

## 2024-05-29 ENCOUNTER — TELEPHONE (OUTPATIENT)
Dept: CARDIOLOGY | Facility: HOSPITAL | Age: 74
End: 2024-05-29
Payer: MEDICARE

## 2024-05-30 ENCOUNTER — LAB (OUTPATIENT)
Dept: LAB | Facility: LAB | Age: 74
End: 2024-05-30
Payer: MEDICARE

## 2024-05-30 ENCOUNTER — ANTICOAGULATION - WARFARIN VISIT (OUTPATIENT)
Dept: CARDIOLOGY | Facility: CLINIC | Age: 74
End: 2024-05-30

## 2024-05-30 DIAGNOSIS — I10 PRIMARY HYPERTENSION: ICD-10-CM

## 2024-05-30 DIAGNOSIS — I48.0 PAROXYSMAL ATRIAL FIBRILLATION (MULTI): ICD-10-CM

## 2024-05-30 DIAGNOSIS — I48.0 PAROXYSMAL ATRIAL FIBRILLATION (MULTI): Primary | ICD-10-CM

## 2024-05-30 LAB
ANION GAP SERPL CALC-SCNC: 11 MMOL/L (ref 10–20)
BUN SERPL-MCNC: 29 MG/DL (ref 6–23)
CALCIUM SERPL-MCNC: 9.1 MG/DL (ref 8.6–10.3)
CHLORIDE SERPL-SCNC: 100 MMOL/L (ref 98–107)
CO2 SERPL-SCNC: 32 MMOL/L (ref 21–32)
CREAT SERPL-MCNC: 0.98 MG/DL (ref 0.5–1.05)
EGFRCR SERPLBLD CKD-EPI 2021: 60 ML/MIN/1.73M*2
GLUCOSE SERPL-MCNC: 85 MG/DL (ref 74–99)
POC INR: 3.3
POC PROTHROMBIN TIME: NORMAL
POTASSIUM SERPL-SCNC: 4.3 MMOL/L (ref 3.5–5.3)
SODIUM SERPL-SCNC: 139 MMOL/L (ref 136–145)

## 2024-05-30 PROCEDURE — 85610 PROTHROMBIN TIME: CPT | Mod: QW | Performed by: INTERNAL MEDICINE

## 2024-05-30 PROCEDURE — 36415 COLL VENOUS BLD VENIPUNCTURE: CPT

## 2024-05-30 PROCEDURE — 85610 PROTHROMBIN TIME: CPT | Mod: 91 | Performed by: INTERNAL MEDICINE

## 2024-05-30 PROCEDURE — 80048 BASIC METABOLIC PNL TOTAL CA: CPT

## 2024-06-12 ENCOUNTER — ANTICOAGULATION - WARFARIN VISIT (OUTPATIENT)
Dept: CARDIOLOGY | Facility: HOSPITAL | Age: 74
End: 2024-06-12
Payer: MEDICARE

## 2024-06-12 DIAGNOSIS — I48.0 PAROXYSMAL ATRIAL FIBRILLATION (MULTI): Primary | ICD-10-CM

## 2024-06-12 LAB
POC INR: 2.8
POC PROTHROMBIN TIME: NORMAL

## 2024-06-17 ENCOUNTER — TELEPHONE (OUTPATIENT)
Dept: PRIMARY CARE | Facility: CLINIC | Age: 74
End: 2024-06-17
Payer: MEDICARE

## 2024-06-17 NOTE — TELEPHONE ENCOUNTER
Pt called and wants to know what she can take for arthritis in hips. Pt had xray with miguelangel Last month

## 2024-06-27 ENCOUNTER — TELEPHONE (OUTPATIENT)
Dept: CARDIOLOGY | Facility: CLINIC | Age: 74
End: 2024-06-27
Payer: MEDICARE

## 2024-07-02 NOTE — TELEPHONE ENCOUNTER
SPOKE WITH PATIENT. SHE WILL HOLD OFF ON REFERRAL AT THIS TIME. PT STATES PAIN HAS SLIGHTLY IMPROVED. SHE WILL CALL FOR REFERRAL IF ANYTHING CHANGES.

## 2024-07-12 DIAGNOSIS — E55.9 VITAMIN D DEFICIENCY: ICD-10-CM

## 2024-07-15 RX ORDER — ERGOCALCIFEROL 1.25 MG/1
1 CAPSULE ORAL
Qty: 12 CAPSULE | Refills: 0 | Status: SHIPPED | OUTPATIENT
Start: 2024-07-21

## 2024-07-25 ENCOUNTER — ANTICOAGULATION - WARFARIN VISIT (OUTPATIENT)
Dept: CARDIOLOGY | Facility: CLINIC | Age: 74
End: 2024-07-25
Payer: MEDICARE

## 2024-07-25 DIAGNOSIS — I48.0 PAROXYSMAL ATRIAL FIBRILLATION (MULTI): ICD-10-CM

## 2024-07-25 LAB
POC INR: 2.5
POC PROTHROMBIN TIME: NORMAL

## 2024-08-06 ENCOUNTER — OFFICE VISIT (OUTPATIENT)
Dept: PRIMARY CARE | Facility: CLINIC | Age: 74
End: 2024-08-06
Payer: MEDICARE

## 2024-08-06 VITALS
SYSTOLIC BLOOD PRESSURE: 102 MMHG | HEIGHT: 63 IN | HEART RATE: 65 BPM | OXYGEN SATURATION: 97 % | RESPIRATION RATE: 18 BRPM | DIASTOLIC BLOOD PRESSURE: 68 MMHG | TEMPERATURE: 97.7 F | WEIGHT: 170.2 LBS | BODY MASS INDEX: 30.16 KG/M2

## 2024-08-06 DIAGNOSIS — M25.551 PAIN OF RIGHT HIP JOINT: ICD-10-CM

## 2024-08-06 DIAGNOSIS — F32.A DEPRESSION, UNSPECIFIED DEPRESSION TYPE: ICD-10-CM

## 2024-08-06 DIAGNOSIS — E11.9 DIABETES MELLITUS WITHOUT COMPLICATION (MULTI): ICD-10-CM

## 2024-08-06 DIAGNOSIS — E03.9 HYPOTHYROIDISM, UNSPECIFIED TYPE: ICD-10-CM

## 2024-08-06 DIAGNOSIS — G47.9 SLEEP DIFFICULTIES: Primary | ICD-10-CM

## 2024-08-06 LAB
CREAT UR-MCNC: 129.6 MG/DL
MICROALBUMIN UR-MCNC: <12 MG/L (ref 0–23)
MICROALBUMIN/CREAT UR: NORMAL MG/G{CREAT}
POC HEMOGLOBIN A1C: 6 % (ref 4.2–6.5)

## 2024-08-06 PROCEDURE — 3078F DIAST BP <80 MM HG: CPT | Performed by: NURSE PRACTITIONER

## 2024-08-06 PROCEDURE — 83036 HEMOGLOBIN GLYCOSYLATED A1C: CPT | Performed by: NURSE PRACTITIONER

## 2024-08-06 PROCEDURE — 82570 ASSAY OF URINE CREATININE: CPT | Performed by: NURSE PRACTITIONER

## 2024-08-06 PROCEDURE — 1159F MED LIST DOCD IN RCRD: CPT | Performed by: NURSE PRACTITIONER

## 2024-08-06 PROCEDURE — 1125F AMNT PAIN NOTED PAIN PRSNT: CPT | Performed by: NURSE PRACTITIONER

## 2024-08-06 PROCEDURE — 99214 OFFICE O/P EST MOD 30 MIN: CPT | Performed by: NURSE PRACTITIONER

## 2024-08-06 PROCEDURE — 3074F SYST BP LT 130 MM HG: CPT | Performed by: NURSE PRACTITIONER

## 2024-08-06 PROCEDURE — 4010F ACE/ARB THERAPY RXD/TAKEN: CPT | Performed by: NURSE PRACTITIONER

## 2024-08-06 PROCEDURE — 3062F POS MACROALBUMINURIA REV: CPT | Performed by: NURSE PRACTITIONER

## 2024-08-06 RX ORDER — GLIPIZIDE 2.5 MG/1
2.5 TABLET, EXTENDED RELEASE ORAL
Qty: 90 TABLET | Refills: 1 | Status: SHIPPED | OUTPATIENT
Start: 2024-08-06

## 2024-08-06 RX ORDER — KETOCONAZOLE 20 MG/ML
SHAMPOO, SUSPENSION TOPICAL
COMMUNITY
Start: 2024-07-24

## 2024-08-06 RX ORDER — TRAZODONE HYDROCHLORIDE 100 MG/1
100 TABLET ORAL DAILY
Qty: 90 TABLET | Refills: 0 | Status: SHIPPED | OUTPATIENT
Start: 2024-08-06

## 2024-08-06 RX ORDER — LEVOTHYROXINE SODIUM 75 UG/1
75 TABLET ORAL DAILY
Qty: 90 TABLET | Refills: 0 | Status: SHIPPED | OUTPATIENT
Start: 2024-08-06

## 2024-08-06 RX ORDER — DICLOFENAC SODIUM 10 MG/G
4 GEL TOPICAL 4 TIMES DAILY
Qty: 100 G | Refills: 1 | Status: SHIPPED | OUTPATIENT
Start: 2024-08-06

## 2024-08-06 ASSESSMENT — LIFESTYLE VARIABLES
SKIP TO QUESTIONS 9-10: 1
HOW OFTEN DO YOU HAVE SIX OR MORE DRINKS ON ONE OCCASION: NEVER
AUDIT-C TOTAL SCORE: 4
HOW OFTEN DO YOU HAVE A DRINK CONTAINING ALCOHOL: 4 OR MORE TIMES A WEEK
HOW MANY STANDARD DRINKS CONTAINING ALCOHOL DO YOU HAVE ON A TYPICAL DAY: 1 OR 2

## 2024-08-06 ASSESSMENT — PAIN SCALES - GENERAL: PAINLEVEL: 1

## 2024-08-06 ASSESSMENT — ENCOUNTER SYMPTOMS: NUMBNESS: 1

## 2024-08-09 ENCOUNTER — TELEPHONE (OUTPATIENT)
Dept: CARDIOLOGY | Facility: CLINIC | Age: 74
End: 2024-08-09

## 2024-08-09 ENCOUNTER — ANTICOAGULATION - WARFARIN VISIT (OUTPATIENT)
Dept: CARDIOLOGY | Facility: HOSPITAL | Age: 74
End: 2024-08-09
Payer: MEDICARE

## 2024-08-09 DIAGNOSIS — I48.0 PAROXYSMAL ATRIAL FIBRILLATION (MULTI): Primary | ICD-10-CM

## 2024-08-09 LAB
POC INR: 1.8
POC PROTHROMBIN TIME: NORMAL

## 2024-08-21 ENCOUNTER — ANTICOAGULATION - WARFARIN VISIT (OUTPATIENT)
Dept: CARDIOLOGY | Facility: CLINIC | Age: 74
End: 2024-08-21
Payer: MEDICARE

## 2024-08-21 ENCOUNTER — TELEPHONE (OUTPATIENT)
Dept: CARDIOLOGY | Facility: HOSPITAL | Age: 74
End: 2024-08-21

## 2024-08-21 DIAGNOSIS — I48.0 PAROXYSMAL ATRIAL FIBRILLATION (MULTI): Primary | ICD-10-CM

## 2024-08-21 LAB
POC INR: 1.9
POC PROTHROMBIN TIME: NORMAL

## 2024-08-29 ENCOUNTER — EVALUATION (OUTPATIENT)
Dept: PHYSICAL THERAPY | Facility: CLINIC | Age: 74
End: 2024-08-29
Payer: MEDICARE

## 2024-08-29 DIAGNOSIS — M25.551 PAIN OF RIGHT HIP JOINT: ICD-10-CM

## 2024-08-29 PROCEDURE — 97161 PT EVAL LOW COMPLEX 20 MIN: CPT | Mod: GP | Performed by: PHYSICAL THERAPIST

## 2024-08-29 NOTE — PROGRESS NOTES
"Physical Therapy Evaluation and Treatment     Patient Name: Carolann Meyers \"Sheron\"  MRN: [id number]  Encounter date: 2024  Time Calculation  Start Time: 0200  Stop Time: 024  Time Calculation (min): 45 min  PT Evaluation Time Entry  PT Evaluation (Low) Time Entry: 20    Visit # 1 of 10  Visits/Dates Authorized: mn    Current Problem:   Problem List Items Addressed This Visit    None  Visit Diagnoses         Codes    Pain of right hip joint     M25.551          Precautions:          Subjective Evaluation    History of Present Illness  Date of onset: 2024  Mechanism of injury: No incident. Sudden onset. R knee and hip began to hurt about 3 months ago.   Pt is sedentary,likes reading and using computer. She walks 2-3 days per week and goes to [facility] for balance class and chair yoga 2 x week. These do not bother her.     Pain  Current pain ratin (Typical pain is 2-3/10 on R side, hip and knee)  At best pain ratin  At worst pain ratin  Exacerbated by: 2/10 LB in standing, sit to stand, stairs and getting in and out of a car.    Patient Goals  Patient goals for therapy: decreased edema, increased strength, decreased pain, return to sport/leisure activities, improved balance, increased motion and independence with ADLs/IADLs  Patient goal: floor transfers be able to stand up without UE support     Lower extremity functional Scale : 63/80     Objective     Active Range of Motion   Left Hip   Flexion: 125 degrees   Abduction: 30 degrees     Right Hip   Flexion: 110 degrees with pain  Abduction: 20 degrees     Passive Range of Motion   Left Knee   Flexion: 120 degrees   Extension: 0 degrees     Right Knee   Flexion: 100 degrees   Extension: 10 degrees     Strength/Myotome Testing     Left Hip   Planes of Motion   Flexion: 4  Abduction: 4-    Right Hip   Planes of Motion   Flexion: 4-  Abduction: 3+    Left Knee   Flexion: 4+  Extension: 4+    Right Knee   Flexion: 4-  Extension: 4-        Objective "     Active Range of Motion   Left Hip   Flexion: 125 degrees   Abduction: 30 degrees     Right Hip   Flexion: 110 degrees with pain  Abduction: 20 degrees     Passive Range of Motion   Left Knee   Flexion: 120 degrees   Extension: 0 degrees     Right Knee   Flexion: 100 degrees   Extension: 10 degrees     Strength/Myotome Testing     Left Hip   Planes of Motion   Flexion: 4  Abduction: 4-    Right Hip   Planes of Motion   Flexion: 4-  Abduction: 3+    Left Knee   Flexion: 4+  Extension: 4+    Right Knee   Flexion: 4-  Extension: 4-       Objective     Active Range of Motion   Left Hip   Flexion: 125 degrees   Abduction: 30 degrees     Right Hip   Flexion: 110 degrees with pain  Abduction: 20 degrees     Passive Range of Motion   Left Knee   Flexion: 120 degrees   Extension: 0 degrees     Right Knee   Flexion: 100 degrees   Extension: 10 degrees     Strength/Myotome Testing     Left Hip   Planes of Motion   Flexion: 4  Abduction: 4-    Right Hip   Planes of Motion   Flexion: 4-  Abduction: 3+    Left Knee   Flexion: 4+  Extension: 4+    Right Knee   Flexion: 4-  Extension: 4-            Single leg: Firm Eyes Open <8 L/R, R hip pain/trendelenberg sec       Treatments:     Therapeutic Exercise:   Nu step L 3 x 6 min   Tball knee flexion   QS and SLR  Handout access code - [email or web address]    Neuromuscular Re-Ed:     Gait Training:     Ther-Act:    Manual Therapy:     Modalities:         Assessment & Plan     Assessment  Impairments: abnormal gait, abnormal or restricted ROM, activity intolerance, impaired balance, impaired physical strength, lacks appropriate home exercise program and pain with function  Assessment details: Pt is deconditioned with poor core and LE strength, and poor endurance. R hip and knee pain with activity    Goals  floor transfers be able to stand up without UE support    Plan  Therapy options: will be seen for skilled physical therapy services  Planned modality interventions: cryotherapy, TENS,  thermotherapy (hydrocollator packs), ultrasound and electrical stimulation/Russian stimulation  Other planned modality interventions: DN and IASTM  Planned therapy interventions: abdominal trunk stabilization, manual therapy, motor coordination training, balance/weight-bearing training, neuromuscular re-education, body mechanics training, postural training, soft tissue mobilization, flexibility, functional ROM exercises, strengthening, spinal/joint mobilization, stretching, gait training, home exercise program, therapeutic activities, transfer training and joint mobilization  Frequency: 2x week  Duration in weeks: 10  Treatment plan discussed with: patient       Low complexity due to patient's clinical presentation being stable and uncomplicated by any significant comorbidities that may affect rehab tolerance and progression.     Post-Treatment Symptoms:       Goals:   Active       PT Problem       PT Goal 1       Start:  09/05/24    Expected End:  11/27/24       1. Pt will be able to perform sit to stand transfers without UE support and no pan   2. Pt will perform floor transfer with pain and minimal difficulty  3. Pt will improve B LE strength to 5/5 MMT  4. Pt will be independent with HEP  5. Pt will improve LEFS to < 15% impairment

## 2024-09-05 ASSESSMENT — ENCOUNTER SYMPTOMS
PAIN SCALE AT HIGHEST: 5
PAIN SCALE: 0
PAIN SCALE AT LOWEST: 0

## 2024-09-05 ASSESSMENT — ACTIVITIES OF DAILY LIVING (ADL): POOR_BALANCE: 1

## 2024-09-12 ENCOUNTER — TREATMENT (OUTPATIENT)
Dept: PHYSICAL THERAPY | Facility: CLINIC | Age: 74
End: 2024-09-12
Payer: MEDICARE

## 2024-09-12 DIAGNOSIS — R26.81 UNSTEADY GAIT: Primary | ICD-10-CM

## 2024-09-12 DIAGNOSIS — M25.551 PAIN OF RIGHT HIP JOINT: ICD-10-CM

## 2024-09-12 PROCEDURE — 97110 THERAPEUTIC EXERCISES: CPT | Mod: GP | Performed by: PHYSICAL THERAPIST

## 2024-09-12 NOTE — PROGRESS NOTES
Physical Therapy Treatment    Patient Name: Carolann Meyers  MRN: [id number]  Encounter date: 9/12/2024    Time Calculation  Start Time: 0945  Stop Time: 1030  Time Calculation (min): 45 min  PT Therapeutic Procedures Time Entry  Therapeutic Exercise Time Entry: 40    Visit # 2 of 1/1  Visits/Dates Authorized: 6 visits 9/11-11/9 CPTs [id number]      Current Problem:   Problem List Items Addressed This Visit             ICD-10-CM    Unsteady gait - Primary R26.81     Other Visit Diagnoses         Codes    Pain of right hip joint     M25.551               Subjective   General: L lateral thigh pain and tingling started a couple of months ago. R hip is feeling  pretty good     Pre-Treatment Symptoms:        Objective   Findings:           Treatments:      Therapeutic Exercise:   Nu step L 4 x 6 min   Tband ss orange x 3  Heel raise  Step up 5 in, 7 in 15 x  2 L/R  Hip flexion Iso with abd brace  LTR   SKTC  Tball knee flexion   QS and SLR    Neuromuscular Re-Ed:       Therapeutic Activity:      Gait Training:       Manual Therapy:       Modalities:         Assessment:    Pt had difficulty grasping abdominal bracing.     Post-Treatment Symptoms:    2    Plan: Continue with POC       Goals:   Active       PT Problem       PT Goal 1       Start:  09/05/24    Expected End:  11/27/24       1. Pt will be able to perform sit to stand transfers without UE support and no pan   2. Pt will perform floor transfer with pain and minimal difficulty  3. Pt will improve B LE strength to 5/5 MMT  4. Pt will be independent with HEP  5. Pt will improve LEFS to < 15% impairment

## 2024-09-20 ENCOUNTER — TELEPHONE (OUTPATIENT)
Dept: CARDIOLOGY | Facility: CLINIC | Age: 74
End: 2024-09-20

## 2024-09-20 ENCOUNTER — ANTICOAGULATION - WARFARIN VISIT (OUTPATIENT)
Dept: CARDIOLOGY | Facility: HOSPITAL | Age: 74
End: 2024-09-20
Payer: MEDICARE

## 2024-09-20 DIAGNOSIS — I48.0 PAROXYSMAL ATRIAL FIBRILLATION (MULTI): Primary | ICD-10-CM

## 2024-09-20 LAB
POC INR: 3.5
POC PROTHROMBIN TIME: NORMAL

## 2024-09-20 NOTE — PROGRESS NOTES
INR 3.5, left message to hold warfarin for 1 day then decrease to 5 mg M-W-F and 2.5 mg T-TH-S-S. Recheck 2 weeks

## 2024-09-24 DIAGNOSIS — I48.0 PAROXYSMAL ATRIAL FIBRILLATION (MULTI): ICD-10-CM

## 2024-09-25 RX ORDER — SOTALOL HYDROCHLORIDE 80 MG/1
80 TABLET ORAL 2 TIMES DAILY
Qty: 180 TABLET | Refills: 3 | Status: SHIPPED | OUTPATIENT
Start: 2024-09-25

## 2024-10-04 ENCOUNTER — TREATMENT (OUTPATIENT)
Dept: PHYSICAL THERAPY | Facility: CLINIC | Age: 74
End: 2024-10-04
Payer: MEDICARE

## 2024-10-04 DIAGNOSIS — M25.551 PAIN OF RIGHT HIP JOINT: ICD-10-CM

## 2024-10-04 PROCEDURE — 97110 THERAPEUTIC EXERCISES: CPT | Mod: GP | Performed by: PHYSICAL THERAPIST

## 2024-10-04 NOTE — PROGRESS NOTES
Physical Therapy Treatment    Patient Name: Carolann Meyers  MRN: [id number]  Encounter date: 10/4/2024    Time Calculation  Start Time: 0908    Visit # 3 of 6  Visits/Dates Authorized: 6 visits 9/11-11/9 CPTs [id number]      Current Problem:   Problem List Items Addressed This Visit    None  Visit Diagnoses         Codes    Pain of right hip joint     M25.551               Subjective   General: Pt reports that her hips and knees are ok no, but it is her LBP that is her primary compliant.      Pre-Treatment Symptoms:        Objective   Findings:           Treatments:      Therapeutic Exercise:   Nu step L 4 x 6 min   Tband ss orange x 3  Heel raise  Step up 5 in, 7 in 15 x  2 L/R  Hip flexion Iso with abd brace  LTR   SKTC  Tball knee flexion   QS and SLR      Access Code: [email or web address]  URL: [email or web address]  Date: 10/04/2024  Prepared by: Nando Hamilton    Exercises  - Side Stepping with Resistance at Ankles and Counter Support  - 1 x daily - 3 x weekly - 3 sets - 5 reps - 15 feet hold  - Heel Raises with Counter Support  - 1 x daily - 3 x weekly - 2 sets - 15 reps  - Step Up  - 1 x daily - 3 x weekly - 2 sets - 15 reps  - Hooklying Isometric Hip Flexion with Opposite Arm  - 1 x daily - 3 x weekly - 1 sets - 10 reps - 5 sec hold  - Supine Bridge  - 1 x daily - 3 x weekly - 1 sets - 10 reps  - Hooklying Clamshell with Resistance  - 1 x daily - 3 x weekly - 1 sets - 115 reps  - Supine Lower Trunk Rotation  - 3 x daily - 7 x weekly - 2 sets - 10 reps  - Hooklying Single Knee to Chest Stretch  - 1 x daily - 7 x weekly - 3 sets - 10 reps    Neuromuscular Re-Ed:       Therapeutic Activity:      Gait Training:       Manual Therapy:       Modalities:         Assessment:    Focus of session was HEP for core stability, hip strength and LBP.  Tolerated session well. Pt needs encouragement to give feed back on how she tolerates each exercise.  Post-Treatment Symptoms:    2    Plan:  Continue with POC       Goals:   Active       PT Problem       PT Goal 1       Start:  09/05/24    Expected End:  11/27/24       1. Pt will be able to perform sit to stand transfers without UE support and no pan   2. Pt will perform floor transfer with pain and minimal difficulty  3. Pt will improve B LE strength to 5/5 MMT  4. Pt will be independent with HEP  5. Pt will improve LEFS to < 15% impairment

## 2024-10-08 ENCOUNTER — TREATMENT (OUTPATIENT)
Dept: PHYSICAL THERAPY | Facility: CLINIC | Age: 74
End: 2024-10-08
Payer: MEDICARE

## 2024-10-08 DIAGNOSIS — E55.9 VITAMIN D DEFICIENCY: ICD-10-CM

## 2024-10-08 DIAGNOSIS — M25.551 PAIN OF RIGHT HIP JOINT: ICD-10-CM

## 2024-10-08 PROCEDURE — 97110 THERAPEUTIC EXERCISES: CPT | Mod: GP | Performed by: PHYSICAL THERAPIST

## 2024-10-08 RX ORDER — ERGOCALCIFEROL 1.25 MG/1
1 CAPSULE ORAL
Qty: 12 CAPSULE | Refills: 1 | Status: SHIPPED | OUTPATIENT
Start: 2024-10-08

## 2024-10-08 NOTE — PROGRESS NOTES
Physical Therapy Treatment    Patient Name: Carolann Meyers  MRN: [id number]  Encounter date: 10/8/2024    Time Calculation  Start Time: 0945  Stop Time: 1030  Time Calculation (min): 45 min  PT Therapeutic Procedures Time Entry  Therapeutic Exercise Time Entry: 40    Visit # 4 of 6  Visits/Dates Authorized: 6 visits 9/11-11/9 CPTs 56075 [id number]      Current Problem:   Problem List Items Addressed This Visit    None  Visit Diagnoses         Codes    Pain of right hip joint     M25.551               Subjective   General: Pt reports that her LB is better with HEP, but hshe has trouble getting down to the floor to do them - she had already    Made her bed. She also reports that she fell yesterday, bent over to pick something off of the floor and could not stop herself from going forward.   Pre-Treatment Symptoms:        Objective   Findings:           Treatments:      Therapeutic Exercise:   Nu step L 4 x 6 min   Tband ss orange x 3  Heel raise  Step up 5 in, 7 in 15 x  2 L/R  Hip flexion Iso with abd brace  LTR   SKTC  Tball knee flexion   QS and SLR      Access Code: [email or web address]  URL: [email or web address]  Date: 10/04/2024  Prepared by: Nando Hamilton    Exercises  - Side Stepping with Resistance at Ankles and Counter Support  - 1 x daily - 3 x weekly - 3 sets - 5 reps - 15 feet hold  - Heel Raises with Counter Support  - 1 x daily - 3 x weekly - 2 sets - 15 reps  - Step Up  - 1 x daily - 3 x weekly - 2 sets - 15 reps  - Hooklying Isometric Hip Flexion with Opposite Arm  - 1 x daily - 3 x weekly - 1 sets - 10 reps - 5 sec hold  - Supine Bridge  - 1 x daily - 3 x weekly - 1 sets - 10 reps  - Hooklying Clamshell with Resistance  - 1 x daily - 3 x weekly - 1 sets - 115 reps  - Supine Lower Trunk Rotation  - 3 x daily - 7 x weekly - 2 sets - 10 reps  - Hooklying Single Knee to Chest Stretch  - 1 x daily - 7 x weekly - 3 sets - 10 reps    Neuromuscular Re-Ed:       Therapeutic  Activity:      Gait Training:       Manual Therapy:       Modalities:         Assessment:    Focus of session was HEP for core stability, hip strength and LBP.  Tolerated session well. Pt needs encouragement to give feed back on how she tolerates each exercise.  Post-Treatment Symptoms:    2    Plan: Continue with POC       Goals:   Active       PT Problem       PT Goal 1       Start:  09/05/24    Expected End:  11/27/24       1. Pt will be able to perform sit to stand transfers without UE support and no pan   2. Pt will perform floor transfer with pain and minimal difficulty  3. Pt will improve B LE strength to 5/5 MMT  4. Pt will be independent with HEP  5. Pt will improve LEFS to < 15% impairment

## 2024-10-09 ENCOUNTER — ANTICOAGULATION - WARFARIN VISIT (OUTPATIENT)
Dept: CARDIOLOGY | Facility: CLINIC | Age: 74
End: 2024-10-09
Payer: MEDICARE

## 2024-10-09 ENCOUNTER — TELEPHONE (OUTPATIENT)
Dept: CARDIOLOGY | Facility: CLINIC | Age: 74
End: 2024-10-09
Payer: MEDICARE

## 2024-10-09 LAB
INR IN PPP BY COAGULATION ASSAY EXTERNAL: 2.3
PROTHROMBIN TIME (PT) IN PPP BY COAGULATION ASSAY EXTERNAL: NORMAL

## 2024-10-09 NOTE — TELEPHONE ENCOUNTER
Patient called in stating her Coumadin level was 2.3, m,w,f, she is taking 5 mg, t,th 2.5 mg, she wants to know if she needs to adjust or keep the same

## 2024-10-15 ENCOUNTER — TREATMENT (OUTPATIENT)
Dept: PHYSICAL THERAPY | Facility: CLINIC | Age: 74
End: 2024-10-15
Payer: MEDICARE

## 2024-10-15 DIAGNOSIS — M25.551 PAIN OF RIGHT HIP JOINT: ICD-10-CM

## 2024-10-15 PROCEDURE — 97110 THERAPEUTIC EXERCISES: CPT | Mod: GP,CQ

## 2024-10-15 ASSESSMENT — PAIN - FUNCTIONAL ASSESSMENT: PAIN_FUNCTIONAL_ASSESSMENT: 0-10

## 2024-10-15 ASSESSMENT — PAIN SCALES - GENERAL: PAINLEVEL_OUTOF10: 0 - NO PAIN

## 2024-10-15 NOTE — PROGRESS NOTES
Physical Therapy Treatment    Patient Name: Carolann Meyers  MRN: [id number]  Encounter date: 10/15/2024    Time Calculation  Start Time: 1115  Stop Time: 1155  Time Calculation (min): 40 min  PT Therapeutic Procedures Time Entry  Therapeutic Exercise Time Entry: 40    Visit # 5 of 6  Visits/Dates Authorized: 6 visits 9/11-11/9 CPTs 25971 [id number]      Current Problem:   Problem List Items Addressed This Visit             ICD-10-CM    Pain of right hip joint M25.551            Subjective   General:    Pt states she has no pain to speak of today. HEP going well.     Pre-Treatment Symptoms:  Pain Assessment: 0-10  0-10 (Numeric) Pain Score: 0 - No pain    Objective   Findings:           Treatments:      Therapeutic Exercise:   Nu step L 4 x 6 min   Tband ss orange x 3 laps   Tband hip extension lean on counter orange 2x10   Leg Curl 40-45# 2x10  LAQ 5# 2x12  Step up 5 in, 7 in 10 x  2 L/R  no UE support  HL hip adduction iso 1x10  5 sec hold , with bridge 1x10  Tball curl in x20  HL hip abduction blue 2x10   SLR flexion x12 R/L    Deferred:   Heel raise  Hip flexion Iso with abd brace  LTR   SKTC  QS and SLR    Neuromuscular Re-Ed:     Therapeutic Activity:    Gait Training:     Manual Therapy:     Modalities:       HEP:  Access Code: [email or web address]  - Side Stepping with Resistance at Ankles and Counter Support  - 1 x daily - 3 x weekly - 3 sets - 5 reps - 15 feet hold  - Heel Raises with Counter Support  - 1 x daily - 3 x weekly - 2 sets - 15 reps  - Step Up  - 1 x daily - 3 x weekly - 2 sets - 15 reps  - Hooklying Isometric Hip Flexion with Opposite Arm  - 1 x daily - 3 x weekly - 1 sets - 10 reps - 5 sec hold  - Supine Bridge  - 1 x daily - 3 x weekly - 1 sets - 10 reps  - Hooklying Clamshell with Resistance  - 1 x daily - 3 x weekly - 1 sets - 15 reps  - Supine Lower Trunk Rotation  - 3 x daily - 7 x weekly - 2 sets - 10 reps  - Hooklying Single Knee to Chest Stretch  - 1 x daily - 7 x  weekly - 3 sets - 10 reps    Assessment:    Pt had no complaints of pain throughout session today. Tolerated some new exercises, focusing on hip strengthening. Pt was able to complete step ups today without UE support, demonstrating improved strength and stability in LE's. Issued new tbands at end of session to progress strength.     Post-Treatment Symptoms:   Response to Interventions: 0    Plan: Continue with POC       Goals:   Active       PT Problem       PT Goal 1       Start:  09/05/24    Expected End:  11/27/24       1. Pt will be able to perform sit to stand transfers without UE support and no pan   2. Pt will perform floor transfer with pain and minimal difficulty  3. Pt will improve B LE strength to 5/5 MMT  4. Pt will be independent with HEP  5. Pt will improve LEFS to < 15% impairment

## 2024-10-16 PROBLEM — M25.551 PAIN OF RIGHT HIP JOINT: Status: ACTIVE | Noted: 2024-10-16

## 2024-10-22 ENCOUNTER — TREATMENT (OUTPATIENT)
Dept: PHYSICAL THERAPY | Facility: CLINIC | Age: 74
End: 2024-10-22
Payer: MEDICARE

## 2024-10-22 ENCOUNTER — APPOINTMENT (OUTPATIENT)
Dept: CARDIOLOGY | Facility: CLINIC | Age: 74
End: 2024-10-22
Payer: MEDICARE

## 2024-10-22 DIAGNOSIS — M25.551 PAIN OF RIGHT HIP JOINT: ICD-10-CM

## 2024-10-22 PROCEDURE — 97110 THERAPEUTIC EXERCISES: CPT | Mod: GP | Performed by: PHYSICAL THERAPIST

## 2024-10-22 NOTE — PROGRESS NOTES
Physical Therapy Treatment    Patient Name: Carolann Meyers  MRN: [id number]  Encounter date: 10/22/2024    Time Calculation  Start Time: 1120  Stop Time: 1210  Time Calculation (min): 50 min  PT Therapeutic Procedures Time Entry  Therapeutic Exercise Time Entry: 50    Visit # 6 of 6  Visits/Dates Authorized: 6 visits 9/11-11/9 CPTs [id number] [id number]      Current Problem:   Problem List Items Addressed This Visit             ICD-10-CM    Pain of right hip joint M25.551            Subjective   General:    Pt states that hips and knees are doing well, but LBP is bad today.    Pre-Treatment Symptoms:       Objective   Findings:           Treatments:      Therapeutic Exercise:   Nu step L 4 x 6 min   LTR   SKTC  Tband ss orange x 3 laps   Tband hip extension lean on counter orange 2x10   Leg Curl 40-45# 2x10  LAQ 5# 2x12  Step up 5 in, 7 in 10 x  2 L/R  no UE support  HL hip adduction iso 1x10  5 sec hold , with bridge 1x10  Tball curl in x20  HL hip abduction blue 2x10   SLR flexion x12 R/L    Deferred:   Heel raise  Hip flexion Iso with abd brace    QS and SLR    Neuromuscular Re-Ed:     Therapeutic Activity:    Gait Training:     Manual Therapy:     Modalities:       HEP:  Access Code: [email or web address]  - Side Stepping with Resistance at Ankles and Counter Support  - 1 x daily - 3 x weekly - 3 sets - 5 reps - 15 feet hold  - Heel Raises with Counter Support  - 1 x daily - 3 x weekly - 2 sets - 15 reps  - Step Up  - 1 x daily - 3 x weekly - 2 sets - 15 reps  - Hooklying Isometric Hip Flexion with Opposite Arm  - 1 x daily - 3 x weekly - 1 sets - 10 reps - 5 sec hold  - Supine Bridge  - 1 x daily - 3 x weekly - 1 sets - 10 reps  - Hooklying Clamshell with Resistance  - 1 x daily - 3 x weekly - 1 sets - 15 reps  - Supine Lower Trunk Rotation  - 3 x daily - 7 x weekly - 2 sets - 10 reps  - Hooklying Single Knee to Chest Stretch  - 1 x daily - 7 x weekly - 3 sets - 10 reps    Assessment:    Pt had no  complaints of pain throughout session today. Tolerated some new exercises, focusing on hip strengthening. Pt was able to complete step ups today without UE support, demonstrating improved strength and stability in LE's. Issued new tbands at end of session to progress strength.     Post-Treatment Symptoms:        Plan: Continue with POC  - work on floor transfers       Goals:   Active       PT Problem       PT Goal 1       Start:  09/05/24    Expected End:  11/27/24       1. Pt will be able to perform sit to stand transfers without UE support and no pan   2. Pt will perform floor transfer with pain and minimal difficulty  3. Pt will improve B LE strength to 5/5 MMT  4. Pt will be independent with HEP  5. Pt will improve LEFS to < 15% impairment

## 2024-10-29 ENCOUNTER — TREATMENT (OUTPATIENT)
Dept: PHYSICAL THERAPY | Facility: CLINIC | Age: 74
End: 2024-10-29
Payer: MEDICARE

## 2024-10-29 DIAGNOSIS — M25.551 PAIN OF RIGHT HIP JOINT: ICD-10-CM

## 2024-10-29 PROCEDURE — 97124 MASSAGE THERAPY: CPT | Mod: GP | Performed by: PHYSICAL THERAPIST

## 2024-10-29 PROCEDURE — 97110 THERAPEUTIC EXERCISES: CPT | Mod: GP | Performed by: PHYSICAL THERAPIST

## 2024-11-04 ENCOUNTER — ANTICOAGULATION - WARFARIN VISIT (OUTPATIENT)
Dept: CARDIOLOGY | Facility: CLINIC | Age: 74
End: 2024-11-04
Payer: MEDICARE

## 2024-11-04 ENCOUNTER — TELEPHONE (OUTPATIENT)
Dept: CARDIOLOGY | Facility: CLINIC | Age: 74
End: 2024-11-04
Payer: MEDICARE

## 2024-11-04 LAB
INR IN PPP BY COAGULATION ASSAY EXTERNAL: 1.8
PROTHROMBIN TIME (PT) IN PPP BY COAGULATION ASSAY EXTERNAL: NORMAL

## 2024-11-04 NOTE — PROGRESS NOTES
INR 1.8, increase to 2.5 mg M-W-F and 5 mg T-TH-S-S, rcheck 2 weeks, instructions given over phone

## 2024-11-04 NOTE — TELEPHONE ENCOUNTER
Patient called the office with her INR result 1.8 drawn 10/30/24.  She is asking for her coumadin dosage, please advise, thanks  Call back [phone]

## 2024-11-19 ENCOUNTER — CLINICAL SUPPORT (OUTPATIENT)
Dept: PRIMARY CARE | Facility: CLINIC | Age: 74
End: 2024-11-19
Payer: MEDICARE

## 2024-11-19 ENCOUNTER — ANTICOAGULATION - WARFARIN VISIT (OUTPATIENT)
Dept: CARDIOLOGY | Facility: CLINIC | Age: 74
End: 2024-11-19

## 2024-11-19 ENCOUNTER — TELEPHONE (OUTPATIENT)
Dept: CARDIOLOGY | Facility: CLINIC | Age: 74
End: 2024-11-19

## 2024-11-19 ENCOUNTER — OFFICE VISIT (OUTPATIENT)
Dept: PRIMARY CARE | Facility: CLINIC | Age: 74
End: 2024-11-19
Payer: MEDICARE

## 2024-11-19 VITALS
SYSTOLIC BLOOD PRESSURE: 126 MMHG | RESPIRATION RATE: 16 BRPM | HEART RATE: 62 BPM | WEIGHT: 174 LBS | DIASTOLIC BLOOD PRESSURE: 72 MMHG | TEMPERATURE: 97.3 F | OXYGEN SATURATION: 95 % | BODY MASS INDEX: 30.83 KG/M2 | HEIGHT: 63 IN

## 2024-11-19 DIAGNOSIS — E78.2 MIXED HYPERLIPIDEMIA: ICD-10-CM

## 2024-11-19 DIAGNOSIS — G47.9 SLEEP DIFFICULTIES: ICD-10-CM

## 2024-11-19 DIAGNOSIS — I10 HYPERTENSION, UNSPECIFIED TYPE: ICD-10-CM

## 2024-11-19 DIAGNOSIS — E11.9 DIABETES MELLITUS WITHOUT COMPLICATION (MULTI): ICD-10-CM

## 2024-11-19 DIAGNOSIS — E11.65 TYPE 2 DIABETES MELLITUS WITH HYPERGLYCEMIA, WITHOUT LONG-TERM CURRENT USE OF INSULIN: ICD-10-CM

## 2024-11-19 DIAGNOSIS — M85.80 OSTEOPENIA AFTER MENOPAUSE: ICD-10-CM

## 2024-11-19 DIAGNOSIS — M85.80 OSTEOPENIA, UNSPECIFIED LOCATION: ICD-10-CM

## 2024-11-19 DIAGNOSIS — Z12.31 ENCOUNTER FOR SCREENING MAMMOGRAM FOR MALIGNANT NEOPLASM OF BREAST: ICD-10-CM

## 2024-11-19 DIAGNOSIS — M25.50 ARTHRALGIA, UNSPECIFIED JOINT: ICD-10-CM

## 2024-11-19 DIAGNOSIS — E03.9 HYPOTHYROIDISM, UNSPECIFIED TYPE: ICD-10-CM

## 2024-11-19 DIAGNOSIS — Z11.59 ENCOUNTER FOR HEPATITIS C SCREENING TEST FOR LOW RISK PATIENT: ICD-10-CM

## 2024-11-19 DIAGNOSIS — E55.9 VITAMIN D DEFICIENCY: ICD-10-CM

## 2024-11-19 DIAGNOSIS — I05.9 MITRAL VALVE DISORDER: ICD-10-CM

## 2024-11-19 DIAGNOSIS — F41.9 ANXIETY: ICD-10-CM

## 2024-11-19 DIAGNOSIS — Z78.0 OSTEOPENIA AFTER MENOPAUSE: ICD-10-CM

## 2024-11-19 DIAGNOSIS — Z00.00 WELL ADULT EXAM: Primary | ICD-10-CM

## 2024-11-19 DIAGNOSIS — F32.A DEPRESSION, UNSPECIFIED DEPRESSION TYPE: ICD-10-CM

## 2024-11-19 LAB
25(OH)D3 SERPL-MCNC: 117 NG/ML (ref 30–100)
ALBUMIN SERPL BCP-MCNC: 3.8 G/DL (ref 3.4–5)
ALP SERPL-CCNC: 92 U/L (ref 33–136)
ALT SERPL W P-5'-P-CCNC: 15 U/L (ref 7–45)
ANION GAP SERPL CALCULATED.3IONS-SCNC: 9 MMOL/L (ref 10–20)
AST SERPL W P-5'-P-CCNC: 16 U/L (ref 9–39)
BASOPHILS # BLD AUTO: 0.04 X10*3/UL (ref 0–0.1)
BASOPHILS NFR BLD AUTO: 0.7 %
BILIRUB SERPL-MCNC: 0.4 MG/DL (ref 0–1.2)
BUN SERPL-MCNC: 22 MG/DL (ref 6–23)
CALCIUM SERPL-MCNC: 8.9 MG/DL (ref 8.6–10.3)
CHLORIDE SERPL-SCNC: 101 MMOL/L (ref 98–107)
CHOLEST SERPL-MCNC: 159 MG/DL (ref 0–199)
CHOLEST/HDLC SERPL: 2.2 {RATIO}
CO2 SERPL-SCNC: 32 MMOL/L (ref 21–32)
CREAT SERPL-MCNC: 0.88 MG/DL (ref 0.5–1.05)
EGFRCR SERPLBLD CKD-EPI 2021: 69 ML/MIN/1.73M*2
EOSINOPHIL # BLD AUTO: 0.11 X10*3/UL (ref 0–0.4)
EOSINOPHIL NFR BLD AUTO: 1.9 %
ERYTHROCYTE [DISTWIDTH] IN BLOOD BY AUTOMATED COUNT: 14 % (ref 11.5–14.5)
GLUCOSE SERPL-MCNC: 92 MG/DL (ref 74–99)
HCT VFR BLD AUTO: 37.7 % (ref 36–46)
HCV AB SER QL: NONREACTIVE
HDLC SERPL-MCNC: 71.3 MG/DL
HGB BLD-MCNC: 12.1 G/DL (ref 12–16)
IMM GRANULOCYTES # BLD AUTO: 0.01 X10*3/UL (ref 0–0.5)
IMM GRANULOCYTES NFR BLD AUTO: 0.2 % (ref 0–0.9)
INR IN PPP BY COAGULATION ASSAY EXTERNAL: 2.9
LDLC SERPL CALC-MCNC: 68 MG/DL
LYMPHOCYTES # BLD AUTO: 1.69 X10*3/UL (ref 0.8–3)
LYMPHOCYTES NFR BLD AUTO: 28.8 %
MCH RBC QN AUTO: 30.5 PG (ref 26–34)
MCHC RBC AUTO-ENTMCNC: 32.1 G/DL (ref 32–36)
MCV RBC AUTO: 95 FL (ref 80–100)
MONOCYTES # BLD AUTO: 0.34 X10*3/UL (ref 0.05–0.8)
MONOCYTES NFR BLD AUTO: 5.8 %
NEUTROPHILS # BLD AUTO: 3.68 X10*3/UL (ref 1.6–5.5)
NEUTROPHILS NFR BLD AUTO: 62.6 %
NON HDL CHOLESTEROL: 88 MG/DL (ref 0–149)
NRBC BLD-RTO: 0 /100 WBCS (ref 0–0)
PLATELET # BLD AUTO: 314 X10*3/UL (ref 150–450)
POC HEMOGLOBIN A1C: 7 % (ref 4.2–6.5)
POTASSIUM SERPL-SCNC: 4 MMOL/L (ref 3.5–5.3)
PROT SERPL-MCNC: 6.4 G/DL (ref 6.4–8.2)
PROTHROMBIN TIME (PT) IN PPP BY COAGULATION ASSAY EXTERNAL: NORMAL
RBC # BLD AUTO: 3.97 X10*6/UL (ref 4–5.2)
SODIUM SERPL-SCNC: 138 MMOL/L (ref 136–145)
TRIGL SERPL-MCNC: 99 MG/DL (ref 0–149)
TSH SERPL-ACNC: 1.75 MIU/L (ref 0.44–3.98)
VLDL: 20 MG/DL (ref 0–40)
WBC # BLD AUTO: 5.9 X10*3/UL (ref 4.4–11.3)

## 2024-11-19 PROCEDURE — 82306 VITAMIN D 25 HYDROXY: CPT | Performed by: NURSE PRACTITIONER

## 2024-11-19 PROCEDURE — 84443 ASSAY THYROID STIM HORMONE: CPT | Performed by: NURSE PRACTITIONER

## 2024-11-19 PROCEDURE — 4010F ACE/ARB THERAPY RXD/TAKEN: CPT | Performed by: NURSE PRACTITIONER

## 2024-11-19 PROCEDURE — 1158F ADVNC CARE PLAN TLK DOCD: CPT | Performed by: NURSE PRACTITIONER

## 2024-11-19 PROCEDURE — 3062F POS MACROALBUMINURIA REV: CPT | Performed by: NURSE PRACTITIONER

## 2024-11-19 PROCEDURE — 1036F TOBACCO NON-USER: CPT | Performed by: NURSE PRACTITIONER

## 2024-11-19 PROCEDURE — 99215 OFFICE O/P EST HI 40 MIN: CPT | Performed by: NURSE PRACTITIONER

## 2024-11-19 PROCEDURE — 99213 OFFICE O/P EST LOW 20 MIN: CPT | Performed by: NURSE PRACTITIONER

## 2024-11-19 PROCEDURE — 80061 LIPID PANEL: CPT | Performed by: NURSE PRACTITIONER

## 2024-11-19 PROCEDURE — 3078F DIAST BP <80 MM HG: CPT | Performed by: NURSE PRACTITIONER

## 2024-11-19 PROCEDURE — 86803 HEPATITIS C AB TEST: CPT | Performed by: NURSE PRACTITIONER

## 2024-11-19 PROCEDURE — 1123F ACP DISCUSS/DSCN MKR DOCD: CPT | Performed by: NURSE PRACTITIONER

## 2024-11-19 PROCEDURE — 1159F MED LIST DOCD IN RCRD: CPT | Performed by: NURSE PRACTITIONER

## 2024-11-19 PROCEDURE — 36415 COLL VENOUS BLD VENIPUNCTURE: CPT | Performed by: NURSE PRACTITIONER

## 2024-11-19 PROCEDURE — 85025 COMPLETE CBC W/AUTO DIFF WBC: CPT | Performed by: NURSE PRACTITIONER

## 2024-11-19 PROCEDURE — G0439 PPPS, SUBSEQ VISIT: HCPCS | Performed by: NURSE PRACTITIONER

## 2024-11-19 PROCEDURE — 80053 COMPREHEN METABOLIC PANEL: CPT | Performed by: NURSE PRACTITIONER

## 2024-11-19 PROCEDURE — 1170F FXNL STATUS ASSESSED: CPT | Performed by: NURSE PRACTITIONER

## 2024-11-19 PROCEDURE — 83036 HEMOGLOBIN GLYCOSYLATED A1C: CPT | Mod: QW | Performed by: NURSE PRACTITIONER

## 2024-11-19 PROCEDURE — 1126F AMNT PAIN NOTED NONE PRSNT: CPT | Performed by: NURSE PRACTITIONER

## 2024-11-19 PROCEDURE — 3074F SYST BP LT 130 MM HG: CPT | Performed by: NURSE PRACTITIONER

## 2024-11-19 RX ORDER — TRAZODONE HYDROCHLORIDE 100 MG/1
100 TABLET ORAL DAILY
Qty: 90 TABLET | Refills: 0 | Status: SHIPPED | OUTPATIENT
Start: 2024-11-19

## 2024-11-19 RX ORDER — LEVOTHYROXINE SODIUM 75 UG/1
75 TABLET ORAL DAILY
Qty: 90 TABLET | Refills: 0 | Status: SHIPPED | OUTPATIENT
Start: 2024-11-19

## 2024-11-19 ASSESSMENT — PAIN SCALES - GENERAL: PAINLEVEL_OUTOF10: 0-NO PAIN

## 2024-11-19 ASSESSMENT — ACTIVITIES OF DAILY LIVING (ADL)
DOING_HOUSEWORK: INDEPENDENT
GROCERY_SHOPPING: INDEPENDENT
TAKING_MEDICATION: INDEPENDENT
DRESSING: INDEPENDENT
MANAGING_FINANCES: INDEPENDENT
BATHING: INDEPENDENT

## 2024-11-19 ASSESSMENT — LIFESTYLE VARIABLES
HOW MANY STANDARD DRINKS CONTAINING ALCOHOL DO YOU HAVE ON A TYPICAL DAY: 1 OR 2
HOW OFTEN DO YOU HAVE SIX OR MORE DRINKS ON ONE OCCASION: NEVER
SKIP TO QUESTIONS 9-10: 1
AUDIT-C TOTAL SCORE: 1
HOW OFTEN DO YOU HAVE A DRINK CONTAINING ALCOHOL: MONTHLY OR LESS

## 2024-11-19 ASSESSMENT — PATIENT HEALTH QUESTIONNAIRE - PHQ9
2. FEELING DOWN, DEPRESSED OR HOPELESS: NOT AT ALL
1. LITTLE INTEREST OR PLEASURE IN DOING THINGS: NOT AT ALL
SUM OF ALL RESPONSES TO PHQ9 QUESTIONS 1 AND 2: 0

## 2024-11-19 NOTE — PROGRESS NOTES
A1c increased 1.0% since last OV/ 3 months ago  Only on glipizide 2.5mg every day  Intolerance to metformin - GI issues  Reviewed Januvia MOA, side effects/precautions, dosing and administration  Medication education provided by DANIS Edmond, PharmD, BCPS  Aware to call office if cost too expensive  Outside of [facility] income limit

## 2024-11-19 NOTE — PROGRESS NOTES
"Subjective   Patient ID: Sheron Meyers is a 74 y.o. female who presents for No chief complaint on file..    Here for a well visit and dm visit  [facility] elevated  . Not sure why nothing has changed.eye exam current  stopped drinking wine    Diabetes  She presents for her follow-up diabetic visit. She has type 2 diabetes mellitus. Her disease course has been worsening. Symptoms are worsening. Risk factors for coronary artery disease include diabetes mellitus, family history, post-menopausal and sedentary lifestyle. Current diabetic treatment includes oral agent (dual therapy). She is compliant with treatment most of the time.        Review of Systems   All other systems reviewed and are negative.      Objective   /72   Pulse 62   Temp 36.3 °C (97.3 °F)   Resp 16   Ht 1.588 m (5' 2.5\")   Wt 78.9 kg (174 lb)   LMP  (LMP Unknown)   SpO2 95%   BMI 31.32 kg/m²     Physical Exam  Vitals and nursing note reviewed.   Constitutional:       General: She is not in acute distress.  HENT:      Right Ear: Tympanic membrane and ear canal normal.      Left Ear: Tympanic membrane and ear canal normal.      Nose: Nose normal. No rhinorrhea.      Mouth/Throat:      Pharynx: Oropharynx is clear. No oropharyngeal exudate or posterior oropharyngeal erythema.      Comments: Dentition wnl  Eyes:      Extraocular Movements: Extraocular movements intact.      Conjunctiva/sclera: Conjunctivae normal.      Pupils: Pupils are equal, round, and reactive to light.   Neck:      Vascular: No carotid bruit.   Cardiovascular:      Rate and Rhythm: Normal rate and regular rhythm.      Heart sounds: Normal heart sounds. No murmur heard.  Pulmonary:      Breath sounds: Normal breath sounds. No wheezing or rhonchi.   Abdominal:      General: Bowel sounds are normal. There is no distension.      Palpations: Abdomen is soft. There is no mass.      Tenderness: There is no abdominal tenderness. There is no guarding or rebound.      Hernia: No " hernia is present.   Genitourinary:     Comments: Breast exam nl  Musculoskeletal:         General: No swelling or tenderness. Normal range of motion.      Cervical back: Normal range of motion and neck supple.   Feet:      Right foot:      Skin integrity: Skin integrity normal.      Toenail Condition: Right toenails are normal.      Left foot:      Skin integrity: Skin integrity normal.      Toenail Condition: Left toenails are normal.      Comments: Pulses ok  Lymphadenopathy:      Cervical: No cervical adenopathy.   Skin:     General: Skin is warm.      Findings: No rash.   Neurological:      General: No focal deficit present.      Mental Status: She is alert and oriented to person, place, and time.   Psychiatric:         Behavior: Behavior normal.       Assessment/Plan   Problem List Items Addressed This Visit             ICD-10-CM    Anxiety F41.9    Depressed F32.A    Relevant Medications    traZODone (Desyrel) 100 mg tablet    Diabetes mellitus without complication (Multi) E11.9    Relevant Orders    POCT glycosylated hemoglobin (Hb A1C) manually resulted (Completed)    Hyperglycemia due to type 2 diabetes mellitus (Multi) E11.65    Hyperlipidemia E78.5    Relevant Orders    Lipid Panel    Hypertension I10    Relevant Orders    CBC and Auto Differential    Hypothyroid E03.9    Relevant Medications    levothyroxine (Synthroid, Levoxyl) 75 mcg tablet    Other Relevant Orders    TSH with reflex to Free T4 if abnormal    Mitral valve disorder I05.9    Vitamin D deficiency E55.9    Relevant Orders    Vitamin D 25-Hydroxy,Total (for eval of Vitamin D levels)     Other Visit Diagnoses         Codes    Well adult exam    -  Primary Z00.00    Relevant Orders    Comprehensive Metabolic Panel    Sleep difficulties     G47.9    Arthralgia, unspecified joint     M25.50    Encounter for screening mammogram for malignant neoplasm of breast     Z12.31    Relevant Orders    BI mammo bilateral screening tomosynthesis     Osteopenia, unspecified location     M85.80    Relevant Orders    XR DEXA bone density    Osteopenia after menopause     M85.80, Z78.0    Relevant Orders    XR DEXA bone density    Encounter for hepatitis C screening test for low risk patient     Z11.59    Relevant Orders    Hepatitis C antibody        Discussed return in 3 months, dm elevated  adding new medication for dm current with preventative screenings discussedmedication change

## 2024-11-21 ENCOUNTER — HOSPITAL ENCOUNTER (OUTPATIENT)
Dept: RADIOLOGY | Facility: HOSPITAL | Age: 74
Discharge: HOME | End: 2024-11-21
Payer: MEDICARE

## 2024-11-21 VITALS — HEIGHT: 62 IN | BODY MASS INDEX: 31.28 KG/M2 | WEIGHT: 170 LBS

## 2024-11-21 DIAGNOSIS — Z78.0 OSTEOPENIA AFTER MENOPAUSE: ICD-10-CM

## 2024-11-21 DIAGNOSIS — M85.80 OSTEOPENIA AFTER MENOPAUSE: ICD-10-CM

## 2024-11-21 DIAGNOSIS — Z12.31 ENCOUNTER FOR SCREENING MAMMOGRAM FOR MALIGNANT NEOPLASM OF BREAST: ICD-10-CM

## 2024-11-21 DIAGNOSIS — M85.80 OSTEOPENIA, UNSPECIFIED LOCATION: ICD-10-CM

## 2024-11-21 PROCEDURE — 77063 BREAST TOMOSYNTHESIS BI: CPT

## 2024-11-21 PROCEDURE — 77080 DXA BONE DENSITY AXIAL: CPT

## 2024-11-21 PROCEDURE — 77080 DXA BONE DENSITY AXIAL: CPT | Performed by: RADIOLOGY

## 2024-11-26 DIAGNOSIS — R92.8 ABNORMAL MAMMOGRAM: Primary | ICD-10-CM

## 2024-11-29 LAB
INR IN PPP BY COAGULATION ASSAY EXTERNAL: 1.9
PROTHROMBIN TIME (PT) IN PPP BY COAGULATION ASSAY EXTERNAL: NORMAL

## 2024-12-03 ENCOUNTER — TELEPHONE (OUTPATIENT)
Dept: CARDIOLOGY | Facility: CLINIC | Age: 74
End: 2024-12-03
Payer: MEDICARE

## 2024-12-03 ENCOUNTER — ANTICOAGULATION - WARFARIN VISIT (OUTPATIENT)
Dept: CARDIOLOGY | Facility: CLINIC | Age: 74
End: 2024-12-03
Payer: MEDICARE

## 2024-12-03 NOTE — PROGRESS NOTES
INR 1.9 will increase warfarin to 2.5 mg M-TH and 5 mg T-W-F-S-S, recheck 2 weeks.  Patient called and instructions given.

## 2024-12-06 ENCOUNTER — APPOINTMENT (OUTPATIENT)
Dept: RADIOLOGY | Facility: HOSPITAL | Age: 74
End: 2024-12-06
Payer: MEDICARE

## 2024-12-09 ENCOUNTER — APPOINTMENT (OUTPATIENT)
Dept: UROLOGY | Facility: CLINIC | Age: 74
End: 2024-12-09
Payer: MEDICARE

## 2024-12-10 ENCOUNTER — TELEPHONE (OUTPATIENT)
Dept: PRIMARY CARE | Facility: CLINIC | Age: 74
End: 2024-12-10
Payer: MEDICARE

## 2024-12-10 NOTE — TELEPHONE ENCOUNTER
Pt called and states diabetic meds were changed at visit and sugars are running around 25-30 points ahead compared to her normal readings.

## 2024-12-11 ENCOUNTER — ANTICOAGULATION - WARFARIN VISIT (OUTPATIENT)
Dept: CARDIOLOGY | Facility: CLINIC | Age: 74
End: 2024-12-11
Payer: MEDICARE

## 2024-12-11 ENCOUNTER — TELEPHONE (OUTPATIENT)
Dept: CARDIOLOGY | Facility: CLINIC | Age: 74
End: 2024-12-11

## 2024-12-11 LAB
INR IN PPP BY COAGULATION ASSAY EXTERNAL: 4.1
PROTHROMBIN TIME (PT) IN PPP BY COAGULATION ASSAY EXTERNAL: NORMAL

## 2024-12-12 ENCOUNTER — APPOINTMENT (OUTPATIENT)
Dept: UROLOGY | Facility: CLINIC | Age: 74
End: 2024-12-12
Payer: MEDICARE

## 2024-12-12 DIAGNOSIS — N32.81 OAB (OVERACTIVE BLADDER): Primary | ICD-10-CM

## 2024-12-12 LAB
POC APPEARANCE, URINE: CLEAR
POC BILIRUBIN, URINE: NEGATIVE
POC BLOOD, URINE: NEGATIVE
POC COLOR, URINE: YELLOW
POC GLUCOSE, URINE: NEGATIVE MG/DL
POC KETONES, URINE: NEGATIVE MG/DL
POC LEUKOCYTES, URINE: ABNORMAL
POC NITRITE,URINE: NEGATIVE
POC PH, URINE: 6.5 PH
POC PROTEIN, URINE: NEGATIVE MG/DL
POC SPECIFIC GRAVITY, URINE: 1.01
POC UROBILINOGEN, URINE: 0.2 EU/DL

## 2024-12-12 PROCEDURE — 4010F ACE/ARB THERAPY RXD/TAKEN: CPT | Performed by: STUDENT IN AN ORGANIZED HEALTH CARE EDUCATION/TRAINING PROGRAM

## 2024-12-12 PROCEDURE — 3062F POS MACROALBUMINURIA REV: CPT | Performed by: STUDENT IN AN ORGANIZED HEALTH CARE EDUCATION/TRAINING PROGRAM

## 2024-12-12 PROCEDURE — 3048F LDL-C <100 MG/DL: CPT | Performed by: STUDENT IN AN ORGANIZED HEALTH CARE EDUCATION/TRAINING PROGRAM

## 2024-12-12 PROCEDURE — 99204 OFFICE O/P NEW MOD 45 MIN: CPT | Performed by: STUDENT IN AN ORGANIZED HEALTH CARE EDUCATION/TRAINING PROGRAM

## 2024-12-12 PROCEDURE — 1123F ACP DISCUSS/DSCN MKR DOCD: CPT | Performed by: STUDENT IN AN ORGANIZED HEALTH CARE EDUCATION/TRAINING PROGRAM

## 2024-12-12 RX ORDER — VIBEGRON 75 MG/1
75 TABLET, FILM COATED ORAL DAILY
Qty: 84 TABLET | Refills: 0 | COMMUNITY
Start: 2024-12-12 | End: 2025-03-06

## 2024-12-12 RX ORDER — VIBEGRON 75 MG/1
75 TABLET, FILM COATED ORAL DAILY
Qty: 90 TABLET | Refills: 1 | Status: SHIPPED | OUTPATIENT
Start: 2024-12-12 | End: 2025-06-10

## 2024-12-12 NOTE — PROGRESS NOTES
HISTORY OF PRESENT ILLNESS:  Carolann Meyers is a 74 y.o. female who presents today as a new patient. She was seeing a different urologist, but they moved and retired.  She has a history of urgency incontinence and OAB.  She has been on Gemtesa and this is working well for her.  She has no other urologic complaints          Past Medical History  She has a past medical history of Personal history of other diseases of the circulatory system (08/20/2021), Personal history of other diseases of the circulatory system (08/20/2021), Personal history of other endocrine, nutritional and metabolic disease (08/20/2021), and Personal history of other mental and behavioral disorders (08/20/2021).    Surgical History  She has a past surgical history that includes Other surgical history (08/20/2021); Other surgical history (08/20/2021); and Other strabismus surgery (Bilateral, 11/2023).     Social History  She reports that she has never smoked. She has never used smokeless tobacco. She reports current alcohol use of about 7.0 standard drinks of alcohol per week. She reports that she does not use drugs.    Family History  Family History   Problem Relation Name Age of Onset    Other (hay fever) Mother      Arthritis Mother      Mental illness Mother      Arthritis Father      Cancer Father      Diabetes Father      Heart disease Father      Diabetes Brother      Atrial fibrillation Brother      Other (hay fever) Son      Diabetes Other siblings     Heart disease Other siblings         Allergies  Nitrofurantoin macrocrystal, Latex, and Tioconazole      A comprehensive 10+ review of systems was negative except for: see hpi                          PHYSICAL EXAMINATION:  BP Readings from Last 3 Encounters:   11/19/24 126/72   08/06/24 102/68   05/23/24 110/70      Wt Readings from Last 3 Encounters:   11/21/24 77.1 kg (170 lb)   11/19/24 78.9 kg (174 lb)   08/06/24 77.2 kg (170 lb 3.2 oz)      BMI: Estimated body mass index is 31.09  "kg/m² as calculated from the following:    Height as of 11/21/24: 1.575 m (5' 2\").    Weight as of 11/21/24: 77.1 kg (170 lb).  BSA: Estimated body surface area is 1.84 meters squared as calculated from the following:    Height as of 11/21/24: 1.575 m (5' 2\").    Weight as of 11/21/24: 77.1 kg (170 lb).  HEENT: Normocephalic, atraumatic, PER EOMI, nonicteric, trachea normal, thyroid normal, oropharynx normal.  CARDIAC: regular rate & rhythm, S1 & S2 normal.  No heaves, thrills, gallops or murmurs.  LUNGS: Clear to auscultation, no spinal or CV tenderness.  EXTREMITIES: No evidence of cyanosis, clubbing or edema.          Assessment:  74 y.o. female presents as a new patient with OAB       OAB:   Rx gemtesa   Will offset cost with samples      Follow up in 6 months        All questions and concerns were answered and addressed.  The patient expressed understanding and agrees with the plan.     Ruy Hunt MD    Scribe Attestation  By signing my name below, I, Jeanine Calle, Scribelma   attest that this documentation has been prepared under the direction and in the presence of Ruy Hunt MD.  "

## 2024-12-12 NOTE — ASSESSMENT & PLAN NOTE
We increased dose of Losartan on last visit.  Recent labs with Yenni Delgado:  Na+ 138 K+ 4.0 Creat 0.8 (GFR 69)

## 2024-12-17 ENCOUNTER — OFFICE VISIT (OUTPATIENT)
Dept: CARDIOLOGY | Facility: CLINIC | Age: 74
End: 2024-12-17
Payer: MEDICARE

## 2024-12-17 DIAGNOSIS — I10 PRIMARY HYPERTENSION: ICD-10-CM

## 2024-12-17 DIAGNOSIS — E78.2 MIXED HYPERLIPIDEMIA: ICD-10-CM

## 2024-12-17 DIAGNOSIS — I48.0 PAROXYSMAL ATRIAL FIBRILLATION (MULTI): Primary | ICD-10-CM

## 2024-12-19 ENCOUNTER — DOCUMENTATION (OUTPATIENT)
Dept: PHYSICAL THERAPY | Facility: CLINIC | Age: 74
End: 2024-12-19
Payer: MEDICARE

## 2024-12-20 NOTE — TELEPHONE ENCOUNTER
Spoke with patient to discuss glucose readings. Reports she is currently running 100-130s in the morning. States previous to me change, she was 85-95 for her fasting glucose. Discussed this was still an at goal reading and diet could be influencing next day readings. She will continue to monitor and will reach out if needed, will follow up in office in February.

## 2024-12-21 DIAGNOSIS — E11.9 DIABETES MELLITUS WITHOUT COMPLICATION (MULTI): ICD-10-CM

## 2024-12-23 ENCOUNTER — HOSPITAL ENCOUNTER (OUTPATIENT)
Dept: RADIOLOGY | Facility: HOSPITAL | Age: 74
Discharge: HOME | End: 2024-12-23
Payer: MEDICARE

## 2024-12-23 DIAGNOSIS — R92.8 ABNORMAL MAMMOGRAM: ICD-10-CM

## 2024-12-23 DIAGNOSIS — R92.8 OTHER ABNORMAL AND INCONCLUSIVE FINDINGS ON DIAGNOSTIC IMAGING OF BREAST: ICD-10-CM

## 2024-12-23 PROCEDURE — 76642 ULTRASOUND BREAST LIMITED: CPT | Mod: RIGHT SIDE | Performed by: RADIOLOGY

## 2024-12-23 PROCEDURE — 77065 DX MAMMO INCL CAD UNI: CPT | Mod: RT

## 2024-12-23 PROCEDURE — 77065 DX MAMMO INCL CAD UNI: CPT | Mod: RIGHT SIDE | Performed by: RADIOLOGY

## 2024-12-23 PROCEDURE — 76981 USE PARENCHYMA: CPT

## 2024-12-23 PROCEDURE — 76642 ULTRASOUND BREAST LIMITED: CPT | Mod: RT

## 2024-12-23 RX ORDER — BLOOD SUGAR DIAGNOSTIC
STRIP MISCELLANEOUS
Qty: 100 EACH | Refills: 2 | Status: SHIPPED | OUTPATIENT
Start: 2024-12-23

## 2024-12-24 ENCOUNTER — TELEPHONE (OUTPATIENT)
Dept: PRIMARY CARE | Facility: CLINIC | Age: 74
End: 2024-12-24
Payer: MEDICARE

## 2024-12-24 DIAGNOSIS — R92.0 MICROCALCIFICATION OF RIGHT BREAST ON MAMMOGRAPHY: ICD-10-CM

## 2025-01-04 DIAGNOSIS — I10 PRIMARY HYPERTENSION: ICD-10-CM

## 2025-01-06 RX ORDER — HYDROCHLOROTHIAZIDE 25 MG/1
25 TABLET ORAL DAILY
Qty: 90 TABLET | Refills: 3 | Status: SHIPPED | OUTPATIENT
Start: 2025-01-06

## 2025-01-10 ENCOUNTER — TELEPHONE (OUTPATIENT)
Dept: CARDIOLOGY | Facility: CLINIC | Age: 75
End: 2025-01-10

## 2025-01-10 ENCOUNTER — ANTICOAGULATION - WARFARIN VISIT (OUTPATIENT)
Dept: CARDIOLOGY | Facility: HOSPITAL | Age: 75
End: 2025-01-10
Payer: MEDICARE

## 2025-01-10 DIAGNOSIS — I48.0 PAROXYSMAL ATRIAL FIBRILLATION (MULTI): Primary | ICD-10-CM

## 2025-01-10 LAB
INR IN PPP BY COAGULATION ASSAY EXTERNAL: 1.4
PROTHROMBIN TIME (PT) IN PPP BY COAGULATION ASSAY EXTERNAL: NORMAL

## 2025-01-10 RX ORDER — WARFARIN SODIUM 5 MG/1
5 TABLET ORAL SEE ADMIN INSTRUCTIONS
Qty: 90 TABLET | Refills: 3 | Status: SHIPPED | OUTPATIENT
Start: 2025-01-10

## 2025-01-10 RX ORDER — WARFARIN 2.5 MG/1
2.5 TABLET ORAL SEE ADMIN INSTRUCTIONS
Qty: 24 TABLET | Refills: 3 | Status: SHIPPED | OUTPATIENT
Start: 2025-01-10

## 2025-01-10 NOTE — TELEPHONE ENCOUNTER
INR 1.4    I called patient and instructed her to increase the warfarin to 5 mg daily and recheck INR 1/20/25

## 2025-01-23 ENCOUNTER — ANTICOAGULATION - WARFARIN VISIT (OUTPATIENT)
Dept: CARDIOLOGY | Facility: HOSPITAL | Age: 75
End: 2025-01-23
Payer: MEDICARE

## 2025-01-23 LAB
INR IN PPP BY COAGULATION ASSAY EXTERNAL: 3.8
PROTHROMBIN TIME (PT) IN PPP BY COAGULATION ASSAY EXTERNAL: NORMAL

## 2025-01-23 NOTE — TELEPHONE ENCOUNTER
INR 3.8, called patient and instructed to hold for 2 days then decrease to 2.5 mg M-TH and 5 mg T-W-F-S-S, recheck INR in 2weeks

## 2025-01-29 NOTE — ASSESSMENT & PLAN NOTE
Sotalol for suppression of the atrial fibrillation and warfarin as anticoagulant and stroke prevention, we follow INR's.  Palpitations are relatively rare and not prolonged.  Will continue the combination of sotalol with the warfarin.  INR was checked today and was 3.0.  Will thus stay on the 2.5 mg Monday Thursday and 5 mg remaining 5 days, recheck INR in 2 weeks.  Patient may need a biopsy procedure I instructed her to hold her Coumadin 4 days before the biopsy and restart it the day after.

## 2025-02-03 NOTE — PROGRESS NOTES
"Subjective   Patient ID: Carolann Meyers \"Tom" is a 75 y.o. female who presents for consult breast biopsy.    HPI  Patient is new to the clinic and presents today to discuss a breast biopsy due to microcalcification of right breast on mammography on 12/23/2024.  Patient is referred to the clinic by MONICA Whitmore.     BI MAMMO RIGHT DIAGNOSTIC; BI US BREAST LIMITED RIGHT;  12/23/2024  2:29 pm; 12/23/2024 3:09 pm      ACCESSION NUMBER(S):  [id number]; [id number]      ORDERING CLINICIAN:  JESSI FLORES      INDICATION:  Signs/Symptoms:abnormal janie; Signs/Symptoms:CALLBACK.          COMPARISON:  Screening mammogram from 11/21/2024      FINDINGS:  Additional spot compression magnification delete the views of the  right breast were obtained. Targeted ultrasound of the right breast  was also performed.      Mammogram:      Density:  The breasts are heterogeneously dense, which may obscure  small masses.      There is a small group of heterogeneous microcalcifications  identified within the subareolar space of the right breast. No  significant layering identified on the mediolateral projection. No  branching or linearity. There is suggestion of associated mild  architectural distortion/asymmetry in the area of  microcalcifications. This area will be further evaluated with  targeted ultrasound.      Ultrasound:  Targeted ultrasound of the right breast was performed in the area of  mammographic abnormality at 9 o'clock position, 5 cm from the nipple.  Heterogeneous breast tissues identified but no distinct cystic or  solid lesions. The area is soft on elastography.      IMPRESSION:  1. Subcentimeter group of microcalcifications within the right  breast, low but suspicious for malignancy. No associated mass seen  sonographically. Stereotactic guided biopsy recommended.      BI-RADS CATEGORY:      BI-RADS Category:  4 Suspicious.  Recommendation:  Surgical Consultation and Biopsy.  Recommended Date:  " Immediate.  Laterality:  Right.      For any future breast imaging appointments, please call [phone]  ([phone]).          MACRO:  Critical Finding:  See findings. Notification was initiated on  12/23/2024 at 3:13 pm by  Prashanth Valdes.  (**-Y[facility]-**) Instructions:    Previous Biopsy: NO Menarche Age: 10 Age of first delivery: 23 Breastfeed: YES Menopause age: 49 HRT: NO Family history of breast cancer: NO Family history of ovarian cancer: NO Family history of pancreatic cancer: NO G 1 P 1     Social History:  Tobacco Use - NO  Do you use any recreational drugs - NO  Alcohol Use - YES  Caffeine Intake - NO  Working - RETIRED  Marital status -   Living with -SPOUSE     Past Medical History:   Diagnosis Date    Personal history of other diseases of the circulatory system 08/20/2021    History of atrial fibrillation    Personal history of other diseases of the circulatory system 08/20/2021    History of hypertension    Personal history of other endocrine, nutritional and metabolic disease 08/20/2021    History of type 2 diabetes mellitus    Personal history of other mental and behavioral disorders 08/20/2021    History of depression     Past Surgical History:   Procedure Laterality Date    OTHER STRABISMUS SURGERY Bilateral 11/2023    cataracts    OTHER SURGICAL HISTORY  08/20/2021    Hemithyroidectomy    OTHER SURGICAL HISTORY  08/20/2021    Hysterectomy     Family History   Problem Relation Name Age of Onset    Other (hay fever) Mother      Arthritis Mother      Mental illness Mother      Arthritis Father      Cancer Father      Diabetes Father      Heart disease Father      Diabetes Brother      Atrial fibrillation Brother      Other (hay fever) Son      Diabetes Other siblings     Heart disease Other siblings      Allergies   Allergen Reactions    Nitrofurantoin Macrocrystal Unknown    Latex Itching    Tioconazole Unknown         Review of Systems  /76 (BP Location: Left arm, Patient Position: Sitting, BP  "Cuff Size: Adult)   Pulse 68   Temp 36.7 °C (98 °F) (Temporal)   Resp 17   Ht 1.575 m (5' 2\")   Wt 78.5 kg (173 lb)   LMP  (LMP Unknown)   SpO2 97%   BMI 31.64 kg/m²     Objective   Physical Exam  Physical Exam:  /76 (BP Location: Left arm, Patient Position: Sitting, BP Cuff Size: Adult)   Pulse 68   Temp 36.7 °C (98 °F) (Temporal)   Resp 17   Ht 1.575 m (5' 2\")   Wt 78.5 kg (173 lb)   LMP  (LMP Unknown)   SpO2 97%   BMI 31.64 kg/m²    GENERAL APPEARANCE: Patient appears in no acute distress.   EYES: Sclera non-icteric, PERRLA.   ENT Normal appearance of ears and nose.   NECK/THYROID: Neck: no masses. Thyroid: no masses.   LYMPH NODES: No cervical or supraclavicular lymphadenopathy.   CARDIOVASCULAR Heart: RRR, no murmurs; Carotid bruits: none; Peripheral edema: none.   RESPIRATORY: Lungs: Bilateral inspiratory and expiratory wheezing; no respiratory distress.   BREASTS: Exam done both in upright and supine position. On inspection no skin dimpling, no peau d'orange; Masses: none palpable in either breast.  Axillary lymphadenopathy: none.   GI (ABDOMEN) No intraabdominal mass appreciated, no hepatosplenomegaly; Hernia: none; Tenderness: none.   PSYCH: Patient oriented to time, place and person, normal affect.    Assessment/Plan   Problem List Items Addressed This Visit             ICD-10-CM    Microcalcification of right breast on mammography R92.0     Patient to be scheduled for stereotactic biopsy of the breast in the radiology department. Risk, benefits and alternatives to this plan were thoroughly discussed with the patient who agrees to proceed.  The procedure was also thoroughly discussed as well as her follow-up. She is to see me in the office in one week but I also will call as soon as I see the pathology which is usually 4 working days from procedure.   Made aware she needs to stop her blood thinners 4 days prior to biopsy day.                 Vicky Mascorro MD 02/11/25 2:20 PM   "

## 2025-02-04 ENCOUNTER — OFFICE VISIT (OUTPATIENT)
Dept: CARDIOLOGY | Facility: CLINIC | Age: 75
End: 2025-02-04
Payer: MEDICARE

## 2025-02-04 VITALS
HEART RATE: 62 BPM | BODY MASS INDEX: 31.64 KG/M2 | WEIGHT: 173 LBS | DIASTOLIC BLOOD PRESSURE: 86 MMHG | OXYGEN SATURATION: 96 % | RESPIRATION RATE: 16 BRPM | SYSTOLIC BLOOD PRESSURE: 136 MMHG

## 2025-02-04 DIAGNOSIS — I10 PRIMARY HYPERTENSION: ICD-10-CM

## 2025-02-04 DIAGNOSIS — I47.10 SVT (SUPRAVENTRICULAR TACHYCARDIA) (CMS-HCC): Primary | ICD-10-CM

## 2025-02-04 DIAGNOSIS — I48.0 PAROXYSMAL ATRIAL FIBRILLATION (MULTI): ICD-10-CM

## 2025-02-04 DIAGNOSIS — E78.2 MIXED HYPERLIPIDEMIA: ICD-10-CM

## 2025-02-04 LAB
INR IN PPP BY COAGULATION ASSAY EXTERNAL: 3
PROTHROMBIN TIME (PT) IN PPP BY COAGULATION ASSAY EXTERNAL: NORMAL

## 2025-02-04 PROCEDURE — 3079F DIAST BP 80-89 MM HG: CPT | Performed by: INTERNAL MEDICINE

## 2025-02-04 PROCEDURE — 99214 OFFICE O/P EST MOD 30 MIN: CPT | Performed by: INTERNAL MEDICINE

## 2025-02-04 PROCEDURE — 1123F ACP DISCUSS/DSCN MKR DOCD: CPT | Performed by: INTERNAL MEDICINE

## 2025-02-04 PROCEDURE — 1036F TOBACCO NON-USER: CPT | Performed by: INTERNAL MEDICINE

## 2025-02-04 PROCEDURE — 3075F SYST BP GE 130 - 139MM HG: CPT | Performed by: INTERNAL MEDICINE

## 2025-02-04 PROCEDURE — 1126F AMNT PAIN NOTED NONE PRSNT: CPT | Performed by: INTERNAL MEDICINE

## 2025-02-04 PROCEDURE — 1159F MED LIST DOCD IN RCRD: CPT | Performed by: INTERNAL MEDICINE

## 2025-02-04 RX ORDER — LOSARTAN POTASSIUM 100 MG/1
100 TABLET ORAL DAILY
Qty: 90 TABLET | Refills: 3 | Status: SHIPPED | OUTPATIENT
Start: 2025-02-04 | End: 2026-02-04

## 2025-02-04 ASSESSMENT — LIFESTYLE VARIABLES
HOW OFTEN DO YOU HAVE A DRINK CONTAINING ALCOHOL: MONTHLY OR LESS
HAVE YOU OR SOMEONE ELSE BEEN INJURED AS A RESULT OF YOUR DRINKING: NO
HOW OFTEN DO YOU HAVE SIX OR MORE DRINKS ON ONE OCCASION: NEVER
HAS A RELATIVE, FRIEND, DOCTOR, OR ANOTHER HEALTH PROFESSIONAL EXPRESSED CONCERN ABOUT YOUR DRINKING OR SUGGESTED YOU CUT DOWN: NO
AUDIT TOTAL SCORE: 1
SKIP TO QUESTIONS 9-10: 1
AUDIT-C TOTAL SCORE: 1
HOW MANY STANDARD DRINKS CONTAINING ALCOHOL DO YOU HAVE ON A TYPICAL DAY: 1 OR 2

## 2025-02-04 ASSESSMENT — ENCOUNTER SYMPTOMS
PALPITATIONS: 1
HEMATURIA: 0
NUMBNESS: 0
OCCASIONAL FEELINGS OF UNSTEADINESS: 0
DEPRESSION: 0
PARESTHESIAS: 0
LOSS OF SENSATION IN FEET: 0
COUGH: 0
ABDOMINAL PAIN: 0
SHORTNESS OF BREATH: 0
DYSPNEA ON EXERTION: 0
DYSURIA: 0
BLURRED VISION: 0

## 2025-02-04 ASSESSMENT — PATIENT HEALTH QUESTIONNAIRE - PHQ9
2. FEELING DOWN, DEPRESSED OR HOPELESS: NOT AT ALL
SUM OF ALL RESPONSES TO PHQ9 QUESTIONS 1 AND 2: 0
1. LITTLE INTEREST OR PLEASURE IN DOING THINGS: NOT AT ALL

## 2025-02-04 ASSESSMENT — PAIN SCALES - GENERAL: PAINLEVEL_OUTOF10: 0-NO PAIN

## 2025-02-04 NOTE — ASSESSMENT & PLAN NOTE
Lipids recently checked by Yenni Delgado.  LDL cholesterol down to 68, very good.  Continue the atorvastatin at 20 mg daily.

## 2025-02-04 NOTE — ASSESSMENT & PLAN NOTE
Blood pressure mildly elevated today.  She states her home blood pressure readings have all been well-controlled.  Will have her continue to keep a home blood pressure log and bring back with her on return visit.  Will continue the losartan and hydrochlorothiazide at this time.

## 2025-02-04 NOTE — PROGRESS NOTES
Subjective   Sheron Meyers is a 75 y.o. female.    Chief Complaint:  Follow-up (Follow up/)    HPI  The patient states palpitations are relatively brief and infrequent.  She might have an episode once a week that lasts less than a minute.  No prolonged palpitations.  No chest pains.  Blood pressures have been relatively well-controlled on her home monitor.    Review of Systems   Constitutional: Negative for malaise/fatigue.   HENT:  Negative for congestion.    Eyes:  Negative for blurred vision.   Cardiovascular:  Positive for palpitations. Negative for chest pain and dyspnea on exertion.   Respiratory:  Negative for cough and shortness of breath.    Musculoskeletal:  Negative for joint pain.   Gastrointestinal:  Negative for abdominal pain.   Genitourinary:  Negative for dysuria and hematuria.   Neurological:  Negative for numbness and paresthesias.       Objective   Constitutional:       Appearance: Not in distress.   Eyes:      Conjunctiva/sclera: Conjunctivae normal.   Neck:      Vascular: JVD normal.   Pulmonary:      Breath sounds: Normal breath sounds. No wheezing. No rhonchi. No rales.   Cardiovascular:      Normal rate. Regular rhythm.      Murmurs: There is no murmur.      No gallop.  No click. No rub.   Abdominal:      Palpations: Abdomen is soft.   Neurological:      General: No focal deficit present.      Mental Status: Alert.         Lab Review:   Anticoagulation - Warfarin Visit on 01/23/2025   Component Date Value    INR External 01/23/2025 3.80    Anticoagulation - Warfarin Visit on 01/10/2025   Component Date Value    INR External 01/10/2025 1.40    Office Visit on 12/12/2024   Component Date Value    POC Color, Urine 12/12/2024 Yellow     POC Appearance, Urine 12/12/2024 Clear     POC Glucose, Urine 12/12/2024 NEGATIVE     POC Bilirubin, Urine 12/12/2024 NEGATIVE     POC Ketones, Urine 12/12/2024 NEGATIVE     POC Specific Gravity, Ur* 12/12/2024 1.015     POC Blood, Urine 12/12/2024 NEGATIVE      POC PH, Urine 12/12/2024 6.5     POC Protein, Urine 12/12/2024 NEGATIVE     POC Urobilinogen, Urine 12/12/2024 0.2     Poc Nitrite, Urine 12/12/2024 NEGATIVE     POC Leukocytes, Urine 12/12/2024 TRACE (A)    Anticoagulation - Warfarin Visit on 12/11/2024   Component Date Value    INR External 12/11/2024 4.10        Assessment/Plan   The primary encounter diagnosis was SVT (supraventricular tachycardia) (CMS-[facility]). Diagnoses of Paroxysmal atrial fibrillation (Multi), Primary hypertension, and Mixed hyperlipidemia were also pertinent to this visit.    Paroxysmal atrial fibrillation (Multi)  Sotalol for suppression of the atrial fibrillation and warfarin as anticoagulant and stroke prevention, we follow INR's.  Palpitations are relatively rare and not prolonged.  Will continue the combination of sotalol with the warfarin.  INR was checked today and was 3.0.  Will thus stay on the 2.5 mg Monday Thursday and 5 mg remaining 5 days, recheck INR in 2 weeks.  Patient may need a biopsy procedure I instructed her to hold her Coumadin 4 days before the biopsy and restart it the day after.     Hyperlipidemia  Lipids recently checked by Yenni Delgado.  LDL cholesterol down to 68, very good.  Continue the atorvastatin at 20 mg daily.    Hypertension  Blood pressure mildly elevated today.  She states her home blood pressure readings have all been well-controlled.  Will have her continue to keep a home blood pressure log and bring back with her on return visit.  Will continue the losartan and hydrochlorothiazide at this time.    SVT (supraventricular tachycardia) (CMS-[facility])  No prolonged palpitations as described.  Continue the sotalol therapy.

## 2025-02-11 ENCOUNTER — OFFICE VISIT (OUTPATIENT)
Dept: SURGERY | Facility: CLINIC | Age: 75
End: 2025-02-11
Payer: MEDICARE

## 2025-02-11 VITALS
HEART RATE: 68 BPM | SYSTOLIC BLOOD PRESSURE: 131 MMHG | BODY MASS INDEX: 31.83 KG/M2 | HEIGHT: 62 IN | TEMPERATURE: 98 F | RESPIRATION RATE: 17 BRPM | DIASTOLIC BLOOD PRESSURE: 76 MMHG | WEIGHT: 173 LBS | OXYGEN SATURATION: 97 %

## 2025-02-11 DIAGNOSIS — R92.0 MICROCALCIFICATION OF RIGHT BREAST ON MAMMOGRAPHY: ICD-10-CM

## 2025-02-11 PROCEDURE — 3075F SYST BP GE 130 - 139MM HG: CPT | Performed by: SURGERY

## 2025-02-11 PROCEDURE — 99214 OFFICE O/P EST MOD 30 MIN: CPT | Performed by: SURGERY

## 2025-02-11 PROCEDURE — 1125F AMNT PAIN NOTED PAIN PRSNT: CPT | Performed by: SURGERY

## 2025-02-11 PROCEDURE — 1036F TOBACCO NON-USER: CPT | Performed by: SURGERY

## 2025-02-11 PROCEDURE — 1159F MED LIST DOCD IN RCRD: CPT | Performed by: SURGERY

## 2025-02-11 PROCEDURE — 1123F ACP DISCUSS/DSCN MKR DOCD: CPT | Performed by: SURGERY

## 2025-02-11 PROCEDURE — 99204 OFFICE O/P NEW MOD 45 MIN: CPT | Performed by: SURGERY

## 2025-02-11 PROCEDURE — 3078F DIAST BP <80 MM HG: CPT | Performed by: SURGERY

## 2025-02-11 ASSESSMENT — PAIN SCALES - GENERAL: PAINLEVEL_OUTOF10: 1

## 2025-02-11 ASSESSMENT — ENCOUNTER SYMPTOMS
LOSS OF SENSATION IN FEET: 0
DEPRESSION: 0
OCCASIONAL FEELINGS OF UNSTEADINESS: 0

## 2025-02-11 ASSESSMENT — COLUMBIA-SUICIDE SEVERITY RATING SCALE - C-SSRS
6. HAVE YOU EVER DONE ANYTHING, STARTED TO DO ANYTHING, OR PREPARED TO DO ANYTHING TO END YOUR LIFE?: NO
1. IN THE PAST MONTH, HAVE YOU WISHED YOU WERE DEAD OR WISHED YOU COULD GO TO SLEEP AND NOT WAKE UP?: NO
2. HAVE YOU ACTUALLY HAD ANY THOUGHTS OF KILLING YOURSELF?: NO

## 2025-02-11 ASSESSMENT — PATIENT HEALTH QUESTIONNAIRE - PHQ9
SUM OF ALL RESPONSES TO PHQ9 QUESTIONS 1 & 2: 0
1. LITTLE INTEREST OR PLEASURE IN DOING THINGS: NOT AT ALL
2. FEELING DOWN, DEPRESSED OR HOPELESS: NOT AT ALL

## 2025-02-11 NOTE — ASSESSMENT & PLAN NOTE
Patient to be scheduled for stereotactic biopsy of the breast in the radiology department. Risk, benefits and alternatives to this plan were thoroughly discussed with the patient who agrees to proceed.  The procedure was also thoroughly discussed as well as her follow-up. She is to see me in the office in one week but I also will call as soon as I see the pathology which is usually 4 working days from procedure.   Made aware she needs to stop her blood thinners 4 days prior to biopsy day.

## 2025-02-12 ENCOUNTER — ANTICOAGULATION - WARFARIN VISIT (OUTPATIENT)
Dept: CARDIOLOGY | Facility: CLINIC | Age: 75
End: 2025-02-12
Payer: MEDICARE

## 2025-02-12 NOTE — PROGRESS NOTES
INR 3.0 last week.  Updated flowsheet in the new regimen is 2.5 mg on Monday and Thursdays and 5 mg remaining 5 days.  She will have a repeat INR next week.

## 2025-02-18 DIAGNOSIS — E11.9 DIABETES MELLITUS WITHOUT COMPLICATION (MULTI): ICD-10-CM

## 2025-02-19 RX ORDER — GLIPIZIDE 2.5 MG/1
2.5 TABLET, EXTENDED RELEASE ORAL
Qty: 90 TABLET | Refills: 0 | Status: SHIPPED | OUTPATIENT
Start: 2025-02-19

## 2025-02-20 ENCOUNTER — OFFICE VISIT (OUTPATIENT)
Dept: PRIMARY CARE | Facility: CLINIC | Age: 75
End: 2025-02-20
Payer: MEDICARE

## 2025-02-20 ENCOUNTER — HOSPITAL ENCOUNTER (OUTPATIENT)
Dept: RADIOLOGY | Facility: CLINIC | Age: 75
Discharge: HOME | End: 2025-02-20
Payer: MEDICARE

## 2025-02-20 VITALS
RESPIRATION RATE: 18 BRPM | SYSTOLIC BLOOD PRESSURE: 122 MMHG | WEIGHT: 172.2 LBS | DIASTOLIC BLOOD PRESSURE: 72 MMHG | OXYGEN SATURATION: 100 % | BODY MASS INDEX: 31.69 KG/M2 | TEMPERATURE: 97.8 F | HEART RATE: 68 BPM | HEIGHT: 62 IN

## 2025-02-20 DIAGNOSIS — G47.30 SLEEP APNEA, UNSPECIFIED TYPE: Primary | ICD-10-CM

## 2025-02-20 DIAGNOSIS — M25.571 ACUTE RIGHT ANKLE PAIN: ICD-10-CM

## 2025-02-20 DIAGNOSIS — E03.9 HYPOTHYROIDISM, UNSPECIFIED TYPE: ICD-10-CM

## 2025-02-20 DIAGNOSIS — G47.9 SLEEP DIFFICULTIES: ICD-10-CM

## 2025-02-20 DIAGNOSIS — F32.A DEPRESSION, UNSPECIFIED DEPRESSION TYPE: ICD-10-CM

## 2025-02-20 DIAGNOSIS — R92.8 ABNORMAL MAMMOGRAM: ICD-10-CM

## 2025-02-20 DIAGNOSIS — E11.65 TYPE 2 DIABETES MELLITUS WITH HYPERGLYCEMIA, WITHOUT LONG-TERM CURRENT USE OF INSULIN: ICD-10-CM

## 2025-02-20 LAB — POC HEMOGLOBIN A1C: 7.1 % (ref 4.2–6.5)

## 2025-02-20 PROCEDURE — 1126F AMNT PAIN NOTED NONE PRSNT: CPT | Performed by: NURSE PRACTITIONER

## 2025-02-20 PROCEDURE — 73610 X-RAY EXAM OF ANKLE: CPT | Mod: RT

## 2025-02-20 PROCEDURE — 1036F TOBACCO NON-USER: CPT | Performed by: NURSE PRACTITIONER

## 2025-02-20 PROCEDURE — 83036 HEMOGLOBIN GLYCOSYLATED A1C: CPT | Mod: QW | Performed by: NURSE PRACTITIONER

## 2025-02-20 PROCEDURE — 3074F SYST BP LT 130 MM HG: CPT | Performed by: NURSE PRACTITIONER

## 2025-02-20 PROCEDURE — 1123F ACP DISCUSS/DSCN MKR DOCD: CPT | Performed by: NURSE PRACTITIONER

## 2025-02-20 PROCEDURE — 99214 OFFICE O/P EST MOD 30 MIN: CPT | Performed by: NURSE PRACTITIONER

## 2025-02-20 PROCEDURE — 3078F DIAST BP <80 MM HG: CPT | Performed by: NURSE PRACTITIONER

## 2025-02-20 PROCEDURE — 1159F MED LIST DOCD IN RCRD: CPT | Performed by: NURSE PRACTITIONER

## 2025-02-20 RX ORDER — LEVOTHYROXINE SODIUM 75 UG/1
75 TABLET ORAL DAILY
Qty: 90 TABLET | Refills: 2 | Status: SHIPPED | OUTPATIENT
Start: 2025-02-20

## 2025-02-20 RX ORDER — TRAZODONE HYDROCHLORIDE 100 MG/1
100 TABLET ORAL DAILY
Qty: 90 TABLET | Refills: 2 | Status: SHIPPED | OUTPATIENT
Start: 2025-02-20

## 2025-02-20 ASSESSMENT — ENCOUNTER SYMPTOMS: DIABETIC ASSOCIATED SYMPTOMS: 0

## 2025-02-20 ASSESSMENT — PAIN SCALES - GENERAL: PAINLEVEL_OUTOF10: 0-NO PAIN

## 2025-02-20 NOTE — PROGRESS NOTES
"Subjective   Patient ID: Sheron Meyers is a 75 y.o. female who presents for Diabetes.    Pt here for dm visit, has been using cpap nightly, helpfulawaiting  breast bx . Having it on  Tuesday , dmnumber sl elevated, discussed increasing meds    Diabetes  She has type 2 diabetes mellitus. Her disease course has been worsening. There are no hypoglycemic associated symptoms. There are no diabetic associated symptoms. There are no hypoglycemic complications. Symptoms are worsening. Risk factors for coronary artery disease include diabetes mellitus, post-menopausal and sedentary lifestyle. Current diabetic treatment includes oral agent (dual therapy). She is compliant with treatment most of the time. She has not had a previous visit with a dietitian. She participates in exercise intermittently. Her home blood glucose trend is fluctuating minimally. An ACE inhibitor/angiotensin II receptor blocker is being taken. Eye exam is current.        Review of Systems   Psychiatric/Behavioral:          Doing ok       Objective   /72   Pulse 68   Temp 36.6 °C (97.8 °F)   Resp 18   Ht 1.575 m (5' 2\")   Wt 78.1 kg (172 lb 3.2 oz)   LMP  (LMP Unknown)   SpO2 100%   BMI 31.50 kg/m²     Physical Exam  Feet:      Right foot:      Skin integrity: Skin integrity normal.      Toenail Condition: Right toenails are normal.      Left foot:      Skin integrity: Skin integrity normal.      Toenail Condition: Left toenails are normal.      Comments: Pulses ok has r ankle tenderness swelling  no trama        Assessment/Plan   Problem List Items Addressed This Visit             ICD-10-CM    Depressed F32.A    Relevant Medications    traZODone (Desyrel) 100 mg tablet    Hyperglycemia due to type 2 diabetes mellitus (Multi) E11.65    Relevant Medications    SITagliptin phosphate (Januvia) 100 mg tablet    Other Relevant Orders    POCT glycosylated hemoglobin (Hb A1C) manually resulted (Completed)    Hypothyroid E03.9    Relevant " Medications    levothyroxine (Synthroid, Levoxyl) 75 mcg tablet    Sleep apnea - Primary G47.30     Other Visit Diagnoses         Codes    Sleep difficulties     G47.9    Acute right ankle pain     M25.571    Relevant Orders    Referral to Podiatry    Referral to Podiatry    XR ankle right 3+ views    Uric Acid    Abnormal mammogram     R92.8        Return in 3 months increased januvia , watching sugars,   If no resolve in ankle pain  see podiatry

## 2025-02-21 ENCOUNTER — ANTICOAGULATION - WARFARIN VISIT (OUTPATIENT)
Dept: CARDIOLOGY | Facility: CLINIC | Age: 75
End: 2025-02-21
Payer: MEDICARE

## 2025-02-21 DIAGNOSIS — I48.0 PAROXYSMAL ATRIAL FIBRILLATION (MULTI): Primary | ICD-10-CM

## 2025-02-21 LAB
POC INR: 4.6
POC PROTHROMBIN TIME: NORMAL
URATE SERPL-MCNC: 4.8 MG/DL (ref 2.5–7)

## 2025-02-21 NOTE — PROGRESS NOTES
INR 4.6.  Patient called instructed to hold the warfarin until next Wednesday which is a day after her breast biopsy.  At that point we will start 2.5 mg daily and recheck an INR in 1 week.

## 2025-02-25 ENCOUNTER — HOSPITAL ENCOUNTER (OUTPATIENT)
Dept: RADIOLOGY | Facility: CLINIC | Age: 75
Discharge: HOME | End: 2025-02-25
Payer: MEDICARE

## 2025-02-25 DIAGNOSIS — R92.8 OTHER ABNORMAL AND INCONCLUSIVE FINDINGS ON DIAGNOSTIC IMAGING OF BREAST: ICD-10-CM

## 2025-02-25 DIAGNOSIS — R92.1 BREAST CALCIFICATIONS: ICD-10-CM

## 2025-02-25 DIAGNOSIS — R92.0 MICROCALCIFICATION OF RIGHT BREAST ON MAMMOGRAPHY: Primary | ICD-10-CM

## 2025-02-25 PROCEDURE — 2500000004 HC RX 250 GENERAL PHARMACY W/ HCPCS (ALT 636 FOR OP/ED): Performed by: STUDENT IN AN ORGANIZED HEALTH CARE EDUCATION/TRAINING PROGRAM

## 2025-02-25 PROCEDURE — A4648 IMPLANTABLE TISSUE MARKER: HCPCS

## 2025-02-25 PROCEDURE — 19081 BX BREAST 1ST LESION STRTCTC: CPT | Mod: RIGHT SIDE | Performed by: STUDENT IN AN ORGANIZED HEALTH CARE EDUCATION/TRAINING PROGRAM

## 2025-02-25 PROCEDURE — 19081 BX BREAST 1ST LESION STRTCTC: CPT | Mod: RT

## 2025-02-25 PROCEDURE — 77065 DX MAMMO INCL CAD UNI: CPT | Mod: RIGHT SIDE | Performed by: STUDENT IN AN ORGANIZED HEALTH CARE EDUCATION/TRAINING PROGRAM

## 2025-02-25 PROCEDURE — 2720000007 HC OR 272 NO HCPCS

## 2025-02-25 RX ADMIN — Medication 10 ML: at 10:28

## 2025-02-25 ASSESSMENT — PAIN SCALES - GENERAL: PAINLEVEL_OUTOF10: 0 - NO PAIN

## 2025-02-25 NOTE — DISCHARGE INSTRUCTIONS
AFTER THE TEST  A steri-strip and bandage will be placed over the incision. You may shower after 24 hours. Remove bandage after 24 hours. Remove bandage after the shower. Leave the steri-strips in place to fall off on their own. If after 1 week the steri-strips are still on, you may remove them. Avoid swimming or soaking in tub for 3 days.     You may have mild discomfort at the test site. If needed, you may take Tylenol (Acetaminophen) for pain. Please avoid taking NSAIDs, Motrin, Advil, Aleve, or ibuprofen for 24 hours following the biopsy. After 24 hours you may resume NSAIDSs.     If you take aspirin, Plavix, Coumadin, Xarelto or Eliquis please tell us. If these medications were stopped by your provider, please ask them when to resume.     You may have some tenderness, bruising or slight bleeding at the site. Please apply ice packs to the site for 15 minutes on and 15 minutes off for a 2 hour minimum.     Most people can return to their usual routine after the procedure. Avoid Strenuous activity for 24 hours.     Sleep in a bra the night after your biopsy. Continue to do so for comfort.     Call your provider if you have any of the following symptoms :  Fever  Increased pain  Increased bleeding  Redness  Increased swelling  Yellowish drainage  Your provider will get the biopsy results within 5 - 7 days. Call your provider with any questions.     Patient education brochure and pain/comfort measures have been reviewed.   Phone number provided to contact [facility] if problems arise.     Patient verbalized understanding of home going instructions. Patient to restart her coumadin tomorrow per her cardiologists instructions.    Patient left at 1100am.

## 2025-02-26 ENCOUNTER — TELEPHONE (OUTPATIENT)
Dept: RADIOLOGY | Facility: CLINIC | Age: 75
End: 2025-02-26
Payer: MEDICARE

## 2025-03-03 ENCOUNTER — TELEPHONE (OUTPATIENT)
Dept: SURGERY | Facility: CLINIC | Age: 75
End: 2025-03-03
Payer: MEDICARE

## 2025-03-03 DIAGNOSIS — R92.0 MICROCALCIFICATION OF RIGHT BREAST ON MAMMOGRAPHY: Primary | ICD-10-CM

## 2025-03-03 LAB
LAB AP ASR DISCLAIMER: NORMAL
LABORATORY COMMENT REPORT: NORMAL
PATH REPORT.FINAL DX SPEC: NORMAL
PATH REPORT.GROSS SPEC: NORMAL
PATH REPORT.RELEVANT HX SPEC: NORMAL
PATH REPORT.TOTAL CANCER: NORMAL
PATHOLOGY SYNOPTIC REPORT: NORMAL

## 2025-03-03 NOTE — PROGRESS NOTES
"Subjective   Patient ID: Carolann Meyers \"Sheron\" is a 75 y.o. female who presents for discussion of right breast biopsy results, bx on 2/25.  HPI  Patient returns to clinic and presents for discussion of right breast biopsy results, bx on 2/25.  Patient was last seen in clinic on 2/11/2025 for breast biopsy consult.  Surgical pathology was collected and finalized.    FINAL DIAGNOSIS   A. 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      Note: Immunohistochemical stains were performed to assess for microinvasion and further characterize the lesion. Immunohistochemical stain for cytokeratin AE1/3 is positive in epithelial cells, SMMH and p63 highlight a myoepithelial cell layer and e-cadherin is positive supporting ductal phenotype. The morphologic and immunophenotypic findings support a diagnosis of ductal carcinoma in situ with no evidence of microinvasion. ER is in progress and results will be issued in an addendum.          peb   Addendum electronically signed by Matthias Diane DO on 3/3/2025 at 1045  Electronically signed by Matthias Diane DO on 3/3/2025 at 1042      [facility]   By the signature on this report, the individual or group listed as making the Final Interpretation/Diagnosis certifies that they have reviewed this case.    Case Summary Report   Breast Biomarker Reporting Template   Protocol posted: 12/13/2023(Added in Addendum) BREAST BIOMARKER REPORTING TEMPLATE - All Specimens  Test(s) Performed     Estrogen Receptor (ER) Status  Positive (greater than 10% of cells demonstrate nuclear positivity)   Percentage of Cells with Nuclear Positivity  >95 %   Average Intensity of Staining  Strong   Test Type  Food and Drug Administration (FDA) cleared (test / vendor): Roche CONFIRM anti-(ER) (SP1) Rabbit Monoclonal Primary Antibody, Roche " "Multimer Detection   Primary Antibody  SP1   Scoring System  No separate scoring system used   Cold Ischemia and Fixation Times  Meet requirements specified in latest version of the ASCO / CAP Guidelines   Testing Performed on Block Number(s)  A2   METHODS   Fixative  Formalin   Image Analysis  Not performed   Comment(s)  Analysis is performed on DCIS.   .      Clinical History  [facility]   Right breast stereotactic vacuum assisted biopsy of calcifications. In formalin at 1032.  Breast calcifications [R92.1]  Other abnormal and inconclusive findings on diagnostic imaging of breast [R92.8]   Gross Description  [facility]   A: Received in formalin, labeled with the patient´s name and hospital number and \"right breast calcifications\", are multiple irregular/cylindrical segments of yellow-white fatty soft tissue aggregating to 3.1 x 2.7 x 0.9 cm.  The specimen is submitted in toto in two cassettes.  BM     NOTE:  Ischemia time: 2/25/2025 10:30 AM.  This specimen was placed into formalin at: 2/25/2025 10:32 AM.     Social History:  Tobacco Use - NO  Do you use any recreational drugs - NO  Alcohol Use - YES  Caffeine Intake - NO  Working - RETIRED  Marital status -   Living with -SPOUSE    Past Medical History:   Diagnosis Date    Personal history of other diseases of the circulatory system 08/20/2021    History of atrial fibrillation    Personal history of other diseases of the circulatory system 08/20/2021    History of hypertension    Personal history of other endocrine, nutritional and metabolic disease 08/20/2021    History of type 2 diabetes mellitus    Personal history of other mental and behavioral disorders 08/20/2021    History of depression     Family History   Problem Relation Name Age of Onset    Other (hay fever) Mother      Arthritis Mother      Mental illness Mother      Arthritis Father      Cancer Father      Diabetes Father      Heart disease Father      Diabetes Brother      Atrial fibrillation Brother   "    Other (hay fever) Son      Diabetes Other siblings     Heart disease Other siblings      Past Surgical History:   Procedure Laterality Date    OTHER STRABISMUS SURGERY Bilateral 11/2023    cataracts    OTHER SURGICAL HISTORY  08/20/2021    Hemithyroidectomy    OTHER SURGICAL HISTORY  08/20/2021    Hysterectomy     Allergies   Allergen Reactions    Nitrofurantoin Macrocrystal Unknown    Latex Itching    Tioconazole Unknown       Review of Systems  LMP  (LMP Unknown)    Objective   Physical Exam    Assessment/Plan   {Assess/PlanSmartLinks:[id number]}     Breast History:    Patient is new to the clinic and presents today to discuss a breast biopsy due to microcalcification of right breast on mammography on 12/23/2024.  Patient is referred to the clinic by MONICA Whitmore.   Patient to be scheduled for stereotactic biopsy of the breast in the radiology department. Risk, benefits and alternatives to this plan were thoroughly discussed with the patient who agrees to proceed.  The procedure was also thoroughly discussed as well as her follow-up. She is to see me in the office in one week but I also will call as soon as I see the pathology which is usually 4 working days from procedure.   Made aware she needs to stop her blood thinners 4 days prior to biopsy day  Previous Biopsy: NO Menarche Age: 10 Age of first delivery: 23 Breastfeed: YES Menopause age: 49 HRT: NO Family history of breast cancer: NO Family history of ovarian cancer: NO Family history of pancreatic cancer: NO G 1 P 1     EDUARD SONI MA 03/03/25 10:43 AM

## 2025-03-03 NOTE — PROGRESS NOTES
"Subjective   Patient ID: Carolann Meyers \"Sheron\" is a 75 y.o. female who presents for discussion of right breast biopsy results, bx on 2/25.  HPI  Patient returns to clinic and presents for discussion of right breast biopsy results, bx on 2/25.  Patient was last seen in clinic on 2/11/2025 for breast biopsy consult.  Surgical pathology was collected and finalized.    FINAL DIAGNOSIS   A. 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      Note: Immunohistochemical stains were performed to assess for microinvasion and further characterize the lesion. Immunohistochemical stain for cytokeratin AE1/3 is positive in epithelial cells, SMMH and p63 highlight a myoepithelial cell layer and e-cadherin is positive supporting ductal phenotype. The morphologic and immunophenotypic findings support a diagnosis of ductal carcinoma in situ with no evidence of microinvasion. ER is in progress and results will be issued in an addendum.          peb   Addendum electronically signed by Matthias Diane DO on 3/3/2025 at 1045  Electronically signed by Matthias Diane DO on 3/3/2025 at 1042      [facility]   By the signature on this report, the individual or group listed as making the Final Interpretation/Diagnosis certifies that they have reviewed this case.    Case Summary Report   Breast Biomarker Reporting Template   Protocol posted: 12/13/2023(Added in Addendum) BREAST BIOMARKER REPORTING TEMPLATE - All Specimens  Test(s) Performed     Estrogen Receptor (ER) Status  Positive (greater than 10% of cells demonstrate nuclear positivity)   Percentage of Cells with Nuclear Positivity  >95 %   Average Intensity of Staining  Strong   Test Type  Food and Drug Administration (FDA) cleared (test / vendor): Roche CONFIRM anti-(ER) (SP1) Rabbit Monoclonal Primary Antibody, Roche " "Multimer Detection   Primary Antibody  SP1   Scoring System  No separate scoring system used   Cold Ischemia and Fixation Times  Meet requirements specified in latest version of the ASCO / CAP Guidelines   Testing Performed on Block Number(s)  A2   METHODS   Fixative  Formalin   Image Analysis  Not performed   Comment(s)  Analysis is performed on DCIS.   .      Clinical History  [facility]   Right breast stereotactic vacuum assisted biopsy of calcifications. In formalin at 1032.  Breast calcifications [R92.1]  Other abnormal and inconclusive findings on diagnostic imaging of breast [R92.8]   Gross Description  [facility]   A: Received in formalin, labeled with the patient´s name and hospital number and \"right breast calcifications\", are multiple irregular/cylindrical segments of yellow-white fatty soft tissue aggregating to 3.1 x 2.7 x 0.9 cm.  The specimen is submitted in toto in two cassettes.  BM     NOTE:  Ischemia time: 2/25/2025 10:30 AM.  This specimen was placed into formalin at: 2/25/2025 10:32 AM.     Social History:  Tobacco Use - NO  Do you use any recreational drugs - NO  Alcohol Use - YES  Caffeine Intake - NO  Working - RETIRED  Marital status -   Living with -SPOUSE    Past Medical History:   Diagnosis Date   • Personal history of other diseases of the circulatory system 08/20/2021    History of atrial fibrillation   • Personal history of other diseases of the circulatory system 08/20/2021    History of hypertension   • Personal history of other endocrine, nutritional and metabolic disease 08/20/2021    History of type 2 diabetes mellitus   • Personal history of other mental and behavioral disorders 08/20/2021    History of depression     Family History   Problem Relation Name Age of Onset   • Other (hay fever) Mother     • Arthritis Mother     • Mental illness Mother     • Arthritis Father     • Cancer Father     • Diabetes Father     • Heart disease Father     • Diabetes Brother     • Atrial " "fibrillation Brother     • Other (hay fever) Son     • Diabetes Other siblings    • Heart disease Other siblings      Past Surgical History:   Procedure Laterality Date   • BREAST BIOPSY Right 02/25/2025   • OTHER STRABISMUS SURGERY Bilateral 11/2023    cataracts   • OTHER SURGICAL HISTORY  08/20/2021    Hemithyroidectomy   • OTHER SURGICAL HISTORY  08/20/2021    Hysterectomy     Allergies   Allergen Reactions   • Nitrofurantoin Macrocrystal Unknown   • Latex Itching   • Tioconazole Unknown       Review of Systems  /69 (BP Location: Left arm, Patient Position: Sitting, BP Cuff Size: Adult)   Pulse 72   Temp 36.4 °C (97.5 °F) (Temporal)   Resp 17   Ht 1.575 m (5' 2\")   Wt 78 kg (172 lb)   LMP  (LMP Unknown)   SpO2 97%   BMI 31.46 kg/m²    Objective   Physical Exam  Right breast biopsy site healed nicely  Assessment/Plan   Problem List Items Addressed This Visit             ICD-10-CM    Ductal carcinoma in situ (DCIS) of breast - Primary D05.10     Patient is a 75-year-old female with a DCIS right breast measuring approximately 9 mm.  At this time recommend Magseed localization and lumpectomy.  Other options include mastectomy which was not recommended.  Could be with or without immediate reconstruction.  Due to the noninvasive nature of the tumor lymph nodes do not need to be assessed.  Patient was informed if this continues to be a noninvasive cancer there is a high probability radiation will not be recommended although it may be discussed.  Patient was also informed endocrine therapy would likely be recommended.  The risk benefits alternatives of proceeding with Magseed localization and lumpectomy right breast were thoroughly discussed with the patient who agrees to proceed             Breast History:    Patient is new to the clinic and presents today to discuss a breast biopsy due to microcalcification of right breast on mammography on 12/23/2024.  Patient is referred to the clinic by Kait Delgado, " RAFAEL-CNP.   Patient to be scheduled for stereotactic biopsy of the breast in the radiology department. Risk, benefits and alternatives to this plan were thoroughly discussed with the patient who agrees to proceed.  The procedure was also thoroughly discussed as well as her follow-up. She is to see me in the office in one week but I also will call as soon as I see the pathology which is usually 4 working days from procedure.   Made aware she needs to stop her blood thinners 4 days prior to biopsy day  Previous Biopsy: NO Menarche Age: 10 Age of first delivery: 23 Breastfeed: YES Menopause age: 49 HRT: NO Family history of breast cancer: NO Family history of ovarian cancer: NO Family history of pancreatic cancer: NO G 1 P 1     Nancy Liang MA 03/11/25 2:01 PM

## 2025-03-07 ENCOUNTER — ANTICOAGULATION - WARFARIN VISIT (OUTPATIENT)
Dept: CARDIOLOGY | Facility: CLINIC | Age: 75
End: 2025-03-07
Payer: MEDICARE

## 2025-03-07 ENCOUNTER — TELEPHONE (OUTPATIENT)
Dept: CARDIOLOGY | Facility: CLINIC | Age: 75
End: 2025-03-07

## 2025-03-07 DIAGNOSIS — I48.0 PAROXYSMAL ATRIAL FIBRILLATION (MULTI): Primary | ICD-10-CM

## 2025-03-07 LAB
POC INR: 1.2
POC PROTHROMBIN TIME: NORMAL

## 2025-03-07 NOTE — TELEPHONE ENCOUNTER
Patient had been off the warfarin for biopsy procedure.  She had restarted though the 2.5 mg daily for about a week.  Will have her take 5 mg today, 5 mg on Saturday, and then change the new regimen to 5 mg Monday Wednesday Fridays 2.5 mg the remaining 4 days and recheck in 2 weeks.  Of note the patient does have 2.5 mg tablets now.  Patient was called and instructions provided over the phone.

## 2025-03-07 NOTE — PROGRESS NOTES
INR of 1.2.  Patient had been off the Coumadin for biopsy procedure but has been back on the Coumadin at 2.5 mg daily for a little over a week.  Will have the patient take 5 mg today 5 mg tomorrow and then change the new regimen to 5 mg Monday Wednesday Fridays 2.5 mg the other 4 days and recheck the INR in 2 weeks discussed with the patient over the phone.

## 2025-03-10 ENCOUNTER — APPOINTMENT (OUTPATIENT)
Dept: SURGERY | Facility: CLINIC | Age: 75
End: 2025-03-10
Payer: MEDICARE

## 2025-03-11 ENCOUNTER — OFFICE VISIT (OUTPATIENT)
Dept: SURGERY | Facility: CLINIC | Age: 75
End: 2025-03-11
Payer: MEDICARE

## 2025-03-11 VITALS
DIASTOLIC BLOOD PRESSURE: 69 MMHG | HEART RATE: 72 BPM | WEIGHT: 172 LBS | BODY MASS INDEX: 31.65 KG/M2 | HEIGHT: 62 IN | RESPIRATION RATE: 17 BRPM | SYSTOLIC BLOOD PRESSURE: 122 MMHG | OXYGEN SATURATION: 97 % | TEMPERATURE: 97.5 F

## 2025-03-11 DIAGNOSIS — D05.11 DUCTAL CARCINOMA IN SITU (DCIS) OF RIGHT BREAST: Primary | ICD-10-CM

## 2025-03-11 PROBLEM — D05.10 DUCTAL CARCINOMA IN SITU (DCIS) OF BREAST: Status: ACTIVE | Noted: 2025-03-11

## 2025-03-11 PROCEDURE — 3074F SYST BP LT 130 MM HG: CPT | Performed by: SURGERY

## 2025-03-11 PROCEDURE — 1126F AMNT PAIN NOTED NONE PRSNT: CPT | Performed by: SURGERY

## 2025-03-11 PROCEDURE — 1123F ACP DISCUSS/DSCN MKR DOCD: CPT | Performed by: SURGERY

## 2025-03-11 PROCEDURE — 99215 OFFICE O/P EST HI 40 MIN: CPT | Performed by: SURGERY

## 2025-03-11 PROCEDURE — 1036F TOBACCO NON-USER: CPT | Performed by: SURGERY

## 2025-03-11 PROCEDURE — 1159F MED LIST DOCD IN RCRD: CPT | Performed by: SURGERY

## 2025-03-11 PROCEDURE — 3078F DIAST BP <80 MM HG: CPT | Performed by: SURGERY

## 2025-03-11 RX ORDER — CEFAZOLIN SODIUM 2 G/100ML
2 INJECTION, SOLUTION INTRAVENOUS ONCE
OUTPATIENT
Start: 2025-03-11 | End: 2025-03-11

## 2025-03-11 ASSESSMENT — ENCOUNTER SYMPTOMS
OCCASIONAL FEELINGS OF UNSTEADINESS: 0
DEPRESSION: 0
LOSS OF SENSATION IN FEET: 0

## 2025-03-11 ASSESSMENT — PATIENT HEALTH QUESTIONNAIRE - PHQ9
1. LITTLE INTEREST OR PLEASURE IN DOING THINGS: NOT AT ALL
SUM OF ALL RESPONSES TO PHQ9 QUESTIONS 1 & 2: 0
2. FEELING DOWN, DEPRESSED OR HOPELESS: NOT AT ALL

## 2025-03-11 ASSESSMENT — PAIN SCALES - GENERAL: PAINLEVEL_OUTOF10: 0-NO PAIN

## 2025-03-11 NOTE — ASSESSMENT & PLAN NOTE
Patient is a 75-year-old female with a DCIS right breast measuring approximately 9 mm.  At this time recommend Magseed localization and lumpectomy.  Other options include mastectomy which was not recommended.  Could be with or without immediate reconstruction.  Due to the noninvasive nature of the tumor lymph nodes do not need to be assessed.  Patient was informed if this continues to be a noninvasive cancer there is a high probability radiation will not be recommended although it may be discussed.  Patient was also informed endocrine therapy would likely be recommended.  The risk benefits alternatives of proceeding with Magseed localization and lumpectomy right breast were thoroughly discussed with the patient who agrees to proceed

## 2025-03-11 NOTE — PATIENT INSTRUCTIONS
Thank you for scheduling surgery with Dr. Mascorro   Below you will find your Surgery Itinerary to include dates, times, and locations for appointments involved with your procedure.      A representative from the hospital will contact you directly to schedule any Pre Admission Testing appointments needed.    On the day before the scheduled surgery, please call the Same Day Surgery department between 2-4 pm for a time of arrival for the day of procedure.  [facility] [phone]    Nothing to eat or drink after midnight the night before surgery    Surgery with Dr. Mascorro at [facility] - [address], [place] [address]  On: 5/5/2025    Postoperative appointment is scheduled at Surgery office locatoins: [facility] [address] [place], [place] [id number] [phone]                    On: 5/20/2025 at 1:00 PM    *Please note, you may receive a call from our financial counselors if you have a financial liability greater than $250.           [facility]  Pre - Operative Instructions     Your time of arrival for surgery is available the day before surgery. *If the surgery is on a Monday, or the Tuesday following a Monday Holiday, please call Same Day Surgery the Friday before your surgery date.*    DO NOT EAT OR DRINK ANYTHING AFTER MIDNIGHT THE NIGHT BEFORE SURGERY. This includes any beverages (coffee, water, soda, etc.), hard candy, gum or mints. If this is not followed, surgery may be canceled. Please avoid eating a large meal the evening before surgery. Please do not DRINK ALCOHOL or SMOKE FOR 24 HOURS before surgery.     Insulin Instructions - Please do not take any short acting Insulin (Regular or NPH). Do not take any oral diabetic medication on the day of surgery. Long acting Insulins may be taken (Lantus). We will check your blood sugar and administer at the hospital the day of surgery. Patient who have Insulin pumps are to make NO  adjustments.     Prescription Medications - You are encouraged to take prescription medications including heart, blood pressure, anti-seizure, anxiety, breathing medications (including inhalers) with exception to diabetic medications prior to arriving at the hospital the day of surgery. You may take prescribed pain medications as needed. Please remember the dose and time taken so we may inform your Anesthesiologist.     Please bring the name, dosage, and frequency of your medications if you did not provide these on the day of Pre Admission Testing. You may bring the actual bottles if this would be easier for you.     Please bring your prescribed inhalers with you the morning of surgery.     Patients on Anti-platelet and Anticoagulant agents, please read the following:  ASA, NSAIDS stop 5 days prior to surgery  Coumadin stop 5 days prior to surgery unless bridging therapy is needed (metal valve replacement, cardiac stent placement) - if so please speak to your Cardiologist or prescribing physician.   Other anti-platelet and anticoagulant agents:  Plavix (Clopidogrel) stop 5 days prior to surgery  Brilinta (Ticagrelor) stop 5 days prior to surgery   Effient (Prasugrel) stop 7 days prior to surgery   Lovenox (Enoxaparin) stop 24 hours prior to surgery  Arixtra (Fondaparinux) stop 5 days prior to surgery  Xarelto (Rivaroxaban) stop 3 days prior to surgery  Pradaxa (Dabigatran) stop 5 days prior to surgery  Eliquis (Apixaban) stop 3 days prior to surgery   Savaysa (Endoxaban) stop days prior to surgery     Patients on GLP-inhibitors  oral and injectable along with SGLT2 inhibitors please read the following:   SGLT2 inhibitors:        Ertugliflozin (Steglatro)- Stop a full 4 days before surgical/procedure date  Bexagliflozin (Brenzavvy)- Stop a full 3 days before surgical/procedure date  Canagliflozin (Invokana)- Stop a full 3 days before surgical/procedure date  Dapagliflozin (Farxiga)- Stop a full 3 days before  surgical/procedure date  Emagliflozin (Jardiance) - Stop a full 3 days before surgical/procedure date    GLP-1 Inhibitors oral:  Semaglutide (Rybelus)- Hold day of surgery only     GLP-1 Inhibitors Injection weekly:  Dulaglutide (Trulicity) -Stop a full 7 days before surgical/procedure date   Exenatide ER (Bydyreon, Bcise)-Stop a full 7 days before surgical/procedure date   Semiglutide (Ozempic, Wegovy)-Stop a full 7 days before surgical/procedure date     GLP-1 inhibitors injection twice a day:  Exenatide IR (Byetta)- Hold day of surgery only    GLP-1 inhibitors injection daily:  Liraglutide (Saxenda, Victoza)- Hold day of surgery only  Lixisenatide (Adlyxin) -Hold day of surgery only     Please stop all herbal medications 2 weeks prior to surgery     C-PAP Devices - If you have a C-PAP device at home, bring it with you on our day of surgery if your surgery requires you to stay overnight.     Please bring a copy of any Advanced Directives the day of surgery if you did not provide it at Pre Admission Testing. These documents are living barraza and durable power of  for healthcare.     Please notify your physician/surgeon if you develop a cold, sore throat, fever, flu symptoms, COVID symptoms or any changes in your physical conditions.     A shower or bath is preferred the evening before or the morning of surgery.     Remove jewelry before admission to the hospital. It is no longer permitted to tape rings. We ask that you leave all valuables at home. Any items of value will be given to a family member or locked up by security.   If you have a body piercing g that you cannot remove, it is recommended that you have it removed professionally and have a plastic spacer inserted. There is a risk for surgical burns with jewelry left in place.     Remove or wear minimal makeup the day of surgery. You will be asked to remove glasses and contact lenses prior to surgery. Please bring a glass case and/or contact lens case  with you. These items are not provided.     Dentures and partials are usually removed prior to surgery. A denture cup will be provided for you.       You may be asked to remove nail polish. Acrylic nails are now acceptable.     Wear loose comfortable clothing that you will be able to fit over bandages when you leave the hospitals (as appropriate for your surgery).    Patients that are under the age of 18 years must have a parent or guardian present the day of surgery.     Family members of significant others may stay with you on the Same Day Surgery unit. While you are in surgery, they may wait for you in the family waiting area. The physician will speak to the waiting family, if permitted by the patient, after surgery.     Changes or delays in the surgery schedule may occur due to emergencies. The hospital will notify you if this occurs. We apologize for any inconveniences this may present.     You must have a responsible  available to drive you home after surgery. You will not be permitted to drive yourself home after surgery if you have received any anesthesia or sedation during your procedure.     Please visit our website at [email or web address] for more information regarding [facility] services.      For questions about your Pre Admission Testing (PAT)  [facility] [phone]  [facility] [phone]    Thank you for choosing [facility]!

## 2025-03-20 ENCOUNTER — ANTICOAGULATION - WARFARIN VISIT (OUTPATIENT)
Dept: CARDIOLOGY | Facility: HOSPITAL | Age: 75
End: 2025-03-20
Payer: MEDICARE

## 2025-03-20 ENCOUNTER — TELEPHONE (OUTPATIENT)
Dept: CARDIOLOGY | Facility: CLINIC | Age: 75
End: 2025-03-20

## 2025-03-20 DIAGNOSIS — I48.0 PAROXYSMAL ATRIAL FIBRILLATION (MULTI): Primary | ICD-10-CM

## 2025-03-20 LAB
POC INR: 2.4
POC PROTHROMBIN TIME: NORMAL

## 2025-03-20 NOTE — TELEPHONE ENCOUNTER
INR 2.4, very good.  Continue the warfarin 5 mg Monday Wednesday Fridays and 2.5 mg remaining days of the week.  Routine check of INR in 2 weeks.  Discussed with patient over the phone.

## 2025-03-20 NOTE — PROGRESS NOTES
INR 2.4.  We will continue the 5 mg Monday Wednesday Fridays and 2.5 mg remaining 4 days and recheck INR in 2 weeks.  Discussed with patient over the phone.

## 2025-03-25 ENCOUNTER — HOSPITAL ENCOUNTER (OUTPATIENT)
Dept: RADIOLOGY | Facility: HOSPITAL | Age: 75
Discharge: HOME | End: 2025-03-25
Payer: MEDICARE

## 2025-03-25 DIAGNOSIS — R92.0 MICROCALCIFICATION OF RIGHT BREAST ON MAMMOGRAPHY: ICD-10-CM

## 2025-03-25 PROCEDURE — 77049 MRI BREAST C-+ W/CAD BI: CPT

## 2025-03-25 PROCEDURE — 2550000001 HC RX 255 CONTRASTS: Performed by: SURGERY

## 2025-03-25 PROCEDURE — A9575 INJ GADOTERATE MEGLUMI 0.1ML: HCPCS | Performed by: SURGERY

## 2025-03-25 RX ORDER — GADOTERATE MEGLUMINE 376.9 MG/ML
0.2 INJECTION INTRAVENOUS
Status: COMPLETED | OUTPATIENT
Start: 2025-03-25 | End: 2025-03-25

## 2025-03-25 RX ADMIN — GADOTERATE MEGLUMINE 15 ML: 376.9 INJECTION INTRAVENOUS at 11:21

## 2025-03-28 NOTE — PROGRESS NOTES
"Subjective   Patient ID: Carolann Meyers \"Tom" is a 75 y.o. female who presents for follow up visit-discuss surgery and MRI results.  HPI  Patient returns to clinic and presents for  follow up visit-discuss surgery and MRI results.  Patient was last seen in clinic on 3/11/2025 for discussion of right breast biopsy results, bx on 2/25.   Patient had MRI breast bilateral w contrast full protocol completed on 3/25/2025. Impression was Focal non-mass enhancement in the central lateral right breast anterior depth corresponding to the patient's biopsy-proven carcinoma measuring 1.2 x 1.6 x 1.3 cm. No evidence of nipple or chest wall involvement. No lymphadenopathy or additional suspicious areas of enhancement are non mass enhancement in the right breast. No MRI evidence of malignancy in the left breast.     BI MR BREAST BILATERAL WITH CONTRAST FULL PROTOCOL;  3/25/2025 11:38  am      INDICATION:  Biopsy-proven right breast cancer. Staging examination.      ,R92.0 Mammographic microcalcification found on diagnostic imaging of  breast      COMPARISON:  12/23/2024, 11/21/2024, 07/01/2023      TECHNIQUE:  Using a dedicated breast coil, STIR axial and T1-weighted fat  saturation axial images of the breasts were obtained, the latter both  before and after intravenous administration of Gadolinium DTPA. On an  independent workstation, 3-D images were formulated using [employer]  including time enhancement curves, subtraction images and MIP images.      Intravenous contrast: 15 ML of Dotarem      FINDINGS:  Density: Heterogeneous fibroglandular tissue.      There is symmetric mild bilateral background enhancement.      RIGHT BREAST:  Heterogeneous non mass enhancement is seen in the  central lateral right breast at anterior depth with associated biopsy  tissue marker measuring 1.2 x 1.6 x 1.3 cm on series 21, image 99.  Findings correspond to the patient's biopsy-proven carcinoma. No  other suspicious mass or non mass enhancement " is identified.      No axillary or internal mammary lymphadenopathy is appreciated.      LEFT BREAST:  No suspicious mass or nonmass enhancement is identified.      No axillary or internal mammary lymphadenopathy is appreciated.      NON-BREAST FINDINGS:  Cholelithiasis.      IMPRESSION:  Focal non-mass enhancement in the central lateral right breast  anterior depth corresponding to the patient's biopsy-proven carcinoma  measuring 1.2 x 1.6 x 1.3 cm. No evidence of nipple or chest wall  involvement. No lymphadenopathy or additional suspicious areas of  enhancement are non mass enhancement in the right breast.      No MRI evidence of malignancy in the left breast.      BI-RADS CATEGORY:  BI-RADS CATEGORY:  6 Known Biopsy-Proven Malignancy.  Recommendation:  Immediate Follow-up.  Recommended Date:  Immediate.  Laterality:  Right.      MACRO:  None    Social History:  Tobacco Use - NO  Do you use any recreational drugs - NO  Alcohol Use - YES  Caffeine Intake - NO  Working - RETIRED  Marital status -   Living with -SPOUSE    Past Medical History:   Diagnosis Date    Allergic     Arthritis 2024    Depression 1995    Diabetes mellitus (Multi) 2012    Eczema 2020    HL (hearing loss) 2018”    Hypertension 2023’    Personal history of other diseases of the circulatory system 08/20/2021    History of atrial fibrillation    Personal history of other diseases of the circulatory system 08/20/2021    History of hypertension    Personal history of other endocrine, nutritional and metabolic disease 08/20/2021    History of type 2 diabetes mellitus    Personal history of other mental and behavioral disorders 08/20/2021    History of depression    Urinary tract infection      Family History   Problem Relation Name Age of Onset    Other (hay fever) Mother Darlene Clinton     Arthritis Mother Darlene Clinton     Mental illness Mother Darlene Clinton     Depression Mother Darlene Clinton     Hearing loss Mother Darlene  "Mary Beth     Arthritis Father Channing Clinton     Cancer Father Channing Clinton     Diabetes Father Channing Clinton     Heart disease Father Channing Clinton     Hearing loss Father Channing Clinton     Diabetes Brother Ruy Clinton     Atrial fibrillation Brother Ruy Clinton     Hearing loss Brother Ruy Clinton     Other (hay fever) Son      Diabetes Other siblings     Heart disease Other siblings     Alcohol abuse Brother Maximilian Clinton     Diabetes Brother Maximilian Clinton     Hearing loss Brother Maximilian Clinton     Hypertension Brother Maximilian Clinton        Past Surgical History:   Procedure Laterality Date    BREAST BIOPSY Right 02/25/2025    CORONARY ARTERY BYPASS GRAFT      HYSTERECTOMY  1998    OTHER STRABISMUS SURGERY Bilateral 11/2023    cataracts    OTHER SURGICAL HISTORY  08/20/2021    Hemithyroidectomy    OTHER SURGICAL HISTORY  08/20/2021    Hysterectomy    TONSILLECTOMY  1958    WISDOM TOOTH EXTRACTION  1968     Allergies   Allergen Reactions    Nitrofurantoin Macrocrystal Unknown    Latex Itching    Tioconazole Unknown       Review of Systems  /69 (BP Location: Right arm, Patient Position: Sitting, BP Cuff Size: Adult)   Pulse 72   Temp 36.1 °C (96.9 °F) (Temporal)   Resp 17   Ht 1.575 m (5' 2\")   Wt 78 kg (172 lb)   LMP  (LMP Unknown)   SpO2 96%   BMI 31.46 kg/m²    Objective   Physical Exam    Assessment/Plan   Problem List Items Addressed This Visit             ICD-10-CM    Ductal carcinoma in situ (DCIS) of breast - Primary D05.10     Patient with DCIS of the right breast.  Recommend Magseed localization lumpectomy.  On MRI the area appears to be approximately 1.6 cm.  Risk-benefit alternatives of doing the procedure were thoroughly discussed with the patient agrees to proceed.  Patient was made aware there is no need to investigate lymph nodes in this noninvasive cancer.  Patient will ultimately be recommended to have endocrine therapy postoperatively.  Today we discussed how her " surgical day would proceed and I answered any questions she may have              Breast History:     Patient is new to the clinic and presents today to discuss a breast biopsy due to microcalcification of right breast on mammography on 12/23/2024.  Patient is referred to the clinic by MONICA Whitmore.   Patient to be scheduled for stereotactic biopsy of the breast in the radiology department. Risk, benefits and alternatives to this plan were thoroughly discussed with the patient who agrees to proceed.  The procedure was also thoroughly discussed as well as her follow-up. She is to see me in the office in one week but I also will call as soon as I see the pathology which is usually 4 working days from procedure.   Patient returns to clinic and presents for discussion of right breast biopsy results, bx on 2/25.  Patient was last seen in clinic on 2/11/2025 for breast biopsy consult.  Surgical pathology was collected and finalized.  kirit is a 75-year-old female with a DCIS right breast measuring approximately 9 mm. At this time recommend Magseed localization and lumpectomy. Other options include mastectomy which was not recommended. Could be with or without immediate reconstruction. Due to the noninvasive nature of the tumor lymph nodes do not need to be assessed. Patient was informed if this continues to be a noninvasive cancer there is a high probability radiation will not be recommended although it may be discussed. Patient was also informed endocrine therapy would likely be recommended. The risk benefits alternatives of proceeding with Magseed localization and lumpectomy right breast were thoroughly discussed with the patient who agrees to proceed   Made aware she needs to stop her blood thinners 4 days prior to biopsy day  Previous Biopsy: NO Menarche Age: 10 Age of first delivery: 23 Breastfeed: YES Menopause age: 49 HRT: NO Family history of breast cancer: NO Family history of ovarian cancer: NO  Family history of pancreatic cancer: NO G 1 P 1     Vicky Mascorro MD 04/01/25 3:59 PM

## 2025-04-01 ENCOUNTER — OFFICE VISIT (OUTPATIENT)
Dept: SURGERY | Facility: CLINIC | Age: 75
End: 2025-04-01
Payer: MEDICARE

## 2025-04-01 VITALS
BODY MASS INDEX: 31.65 KG/M2 | TEMPERATURE: 96.9 F | SYSTOLIC BLOOD PRESSURE: 120 MMHG | DIASTOLIC BLOOD PRESSURE: 69 MMHG | HEART RATE: 72 BPM | WEIGHT: 172 LBS | HEIGHT: 62 IN | OXYGEN SATURATION: 96 % | RESPIRATION RATE: 17 BRPM

## 2025-04-01 DIAGNOSIS — D05.11 DUCTAL CARCINOMA IN SITU (DCIS) OF RIGHT BREAST: Primary | ICD-10-CM

## 2025-04-01 PROCEDURE — 99214 OFFICE O/P EST MOD 30 MIN: CPT | Performed by: SURGERY

## 2025-04-01 PROCEDURE — 3078F DIAST BP <80 MM HG: CPT | Performed by: SURGERY

## 2025-04-01 PROCEDURE — 1126F AMNT PAIN NOTED NONE PRSNT: CPT | Performed by: SURGERY

## 2025-04-01 PROCEDURE — 1123F ACP DISCUSS/DSCN MKR DOCD: CPT | Performed by: SURGERY

## 2025-04-01 PROCEDURE — 1036F TOBACCO NON-USER: CPT | Performed by: SURGERY

## 2025-04-01 PROCEDURE — 3074F SYST BP LT 130 MM HG: CPT | Performed by: SURGERY

## 2025-04-01 PROCEDURE — 1159F MED LIST DOCD IN RCRD: CPT | Performed by: SURGERY

## 2025-04-01 ASSESSMENT — LIFESTYLE VARIABLES
HOW MANY STANDARD DRINKS CONTAINING ALCOHOL DO YOU HAVE ON A TYPICAL DAY: 1 OR 2
HOW OFTEN DO YOU HAVE SIX OR MORE DRINKS ON ONE OCCASION: NEVER
SKIP TO QUESTIONS 9-10: 1
HOW OFTEN DO YOU HAVE A DRINK CONTAINING ALCOHOL: MONTHLY OR LESS
AUDIT-C TOTAL SCORE: 1

## 2025-04-01 ASSESSMENT — ENCOUNTER SYMPTOMS
OCCASIONAL FEELINGS OF UNSTEADINESS: 0
LOSS OF SENSATION IN FEET: 0
DEPRESSION: 0

## 2025-04-01 ASSESSMENT — PATIENT HEALTH QUESTIONNAIRE - PHQ9
1. LITTLE INTEREST OR PLEASURE IN DOING THINGS: NOT AT ALL
2. FEELING DOWN, DEPRESSED OR HOPELESS: NOT AT ALL
SUM OF ALL RESPONSES TO PHQ9 QUESTIONS 1 & 2: 0

## 2025-04-01 ASSESSMENT — PAIN SCALES - GENERAL: PAINLEVEL_OUTOF10: 0-NO PAIN

## 2025-04-01 NOTE — ASSESSMENT & PLAN NOTE
Patient with DCIS of the right breast.  Recommend Magseed localization lumpectomy.  On MRI the area appears to be approximately 1.6 cm.  Risk-benefit alternatives of doing the procedure were thoroughly discussed with the patient agrees to proceed.  Patient was made aware there is no need to investigate lymph nodes in this noninvasive cancer.  Patient will ultimately be recommended to have endocrine therapy postoperatively.  Today we discussed how her surgical day would proceed and I answered any questions she may have

## 2025-04-02 ENCOUNTER — ANTICOAGULATION - WARFARIN VISIT (OUTPATIENT)
Facility: CLINIC | Age: 75
End: 2025-04-02
Payer: MEDICARE

## 2025-04-02 ENCOUNTER — TELEPHONE (OUTPATIENT)
Dept: CARDIOLOGY | Facility: CLINIC | Age: 75
End: 2025-04-02

## 2025-04-02 DIAGNOSIS — I48.0 PAROXYSMAL ATRIAL FIBRILLATION (MULTI): Primary | ICD-10-CM

## 2025-04-02 LAB
POC INR: 3.1
POC PROTHROMBIN TIME: NORMAL

## 2025-04-02 NOTE — TELEPHONE ENCOUNTER
INR was 3.1.  Instructed patient to reduce the dose of warfarin to 5 mg Monday and Thursdays and 2.5 mg remaining 5 days.  Instructed to repeat INR approximately 2 weeks.  Patient will be scheduled for breast surgery and instructed her to hold the warfarin 4 days prior and then restart afterwards.  This may delay the next INR check.

## 2025-04-02 NOTE — PROGRESS NOTES
INR 3.1.  Will decrease warfarin to 5 mg Monday Thursdays and 2.5 mg the remaining 5 days of the week.  Recheck INR in 2 weeks if able.  Patient will be scheduled for breast procedure and I instructed her to hold the warfarin 4 days prior and then restart afterwards.

## 2025-04-16 ENCOUNTER — HOSPITAL ENCOUNTER (OUTPATIENT)
Dept: RADIOLOGY | Facility: HOSPITAL | Age: 75
Discharge: HOME | End: 2025-04-16
Payer: MEDICARE

## 2025-04-16 DIAGNOSIS — D05.11 DUCTAL CARCINOMA IN SITU (DCIS) OF RIGHT BREAST: ICD-10-CM

## 2025-04-16 PROCEDURE — C1739 HC OR 278 NO HCPCS: HCPCS

## 2025-04-16 PROCEDURE — 2780000003 HC OR 278 NO HCPCS

## 2025-04-16 PROCEDURE — 19281 PERQ DEVICE BREAST 1ST IMAG: CPT | Mod: RT

## 2025-04-16 PROCEDURE — 19281 PERQ DEVICE BREAST 1ST IMAG: CPT | Mod: RIGHT SIDE | Performed by: STUDENT IN AN ORGANIZED HEALTH CARE EDUCATION/TRAINING PROGRAM

## 2025-04-16 PROCEDURE — 2500000004 HC RX 250 GENERAL PHARMACY W/ HCPCS (ALT 636 FOR OP/ED): Mod: JZ | Performed by: STUDENT IN AN ORGANIZED HEALTH CARE EDUCATION/TRAINING PROGRAM

## 2025-04-16 RX ADMIN — Medication 3 ML: at 10:09

## 2025-04-16 ASSESSMENT — PAIN SCALES - GENERAL
PAINLEVEL_OUTOF10: 0 - NO PAIN

## 2025-04-21 ENCOUNTER — PRE-ADMISSION TESTING (OUTPATIENT)
Dept: PREADMISSION TESTING | Facility: HOSPITAL | Age: 75
End: 2025-04-21
Payer: MEDICARE

## 2025-04-21 VITALS
HEART RATE: 74 BPM | TEMPERATURE: 96.6 F | WEIGHT: 172 LBS | RESPIRATION RATE: 16 BRPM | HEIGHT: 63 IN | SYSTOLIC BLOOD PRESSURE: 115 MMHG | BODY MASS INDEX: 30.48 KG/M2 | OXYGEN SATURATION: 100 % | DIASTOLIC BLOOD PRESSURE: 58 MMHG

## 2025-04-21 DIAGNOSIS — Z01.818 PREOPERATIVE TESTING: Primary | ICD-10-CM

## 2025-04-21 LAB
ANION GAP SERPL CALCULATED.3IONS-SCNC: 12 MMOL/L (ref 10–20)
BUN SERPL-MCNC: 19 MG/DL (ref 6–23)
CALCIUM SERPL-MCNC: 9.1 MG/DL (ref 8.6–10.3)
CHLORIDE SERPL-SCNC: 99 MMOL/L (ref 98–107)
CO2 SERPL-SCNC: 32 MMOL/L (ref 21–32)
CREAT SERPL-MCNC: 0.94 MG/DL (ref 0.5–1.05)
EGFRCR SERPLBLD CKD-EPI 2021: 63 ML/MIN/1.73M*2
ERYTHROCYTE [DISTWIDTH] IN BLOOD BY AUTOMATED COUNT: 14.2 % (ref 11.5–14.5)
GLUCOSE SERPL-MCNC: 253 MG/DL (ref 74–99)
HCT VFR BLD AUTO: 38.9 % (ref 36–46)
HGB BLD-MCNC: 12.3 G/DL (ref 12–16)
MCH RBC QN AUTO: 29.9 PG (ref 26–34)
MCHC RBC AUTO-ENTMCNC: 31.6 G/DL (ref 32–36)
MCV RBC AUTO: 94 FL (ref 80–100)
NRBC BLD-RTO: 0 /100 WBCS (ref 0–0)
PLATELET # BLD AUTO: 286 X10*3/UL (ref 150–450)
POTASSIUM SERPL-SCNC: 3.9 MMOL/L (ref 3.5–5.3)
RBC # BLD AUTO: 4.12 X10*6/UL (ref 4–5.2)
SODIUM SERPL-SCNC: 139 MMOL/L (ref 136–145)
WBC # BLD AUTO: 6.6 X10*3/UL (ref 4.4–11.3)

## 2025-04-21 PROCEDURE — 85027 COMPLETE CBC AUTOMATED: CPT

## 2025-04-21 PROCEDURE — 93005 ELECTROCARDIOGRAM TRACING: CPT

## 2025-04-21 PROCEDURE — 36415 COLL VENOUS BLD VENIPUNCTURE: CPT

## 2025-04-21 PROCEDURE — 80048 BASIC METABOLIC PNL TOTAL CA: CPT

## 2025-04-21 PROCEDURE — 93010 ELECTROCARDIOGRAM REPORT: CPT | Performed by: INTERNAL MEDICINE

## 2025-04-21 PROCEDURE — 99204 OFFICE O/P NEW MOD 45 MIN: CPT | Performed by: NURSE PRACTITIONER

## 2025-04-21 ASSESSMENT — DUKE ACTIVITY SCORE INDEX (DASI)
CAN YOU CLIMB A FLIGHT OF STAIRS OR WALK UP A HILL: YES
CAN YOU RUN A SHORT DISTANCE: NO
CAN YOU DO HEAVY WORK AROUND THE HOUSE LIKE SCRUBBING FLOORS OR LIFTING AND MOVING HEAVY FURNITURE: NO
CAN YOU TAKE CARE OF YOURSELF (EAT, DRESS, BATHE, OR USE TOILET): YES
DASI METS SCORE: 7
CAN YOU PARTICIPATE IN MODERATE RECREATIONAL ACTIVITIES LIKE GOLF, BOWLING, DANCING, DOUBLES TENNIS OR THROWING A BASEBALL OR FOOTBALL: YES
TOTAL_SCORE: 34.7
CAN YOU WALK INDOORS, SUCH AS AROUND YOUR HOUSE: YES
CAN YOU PARTICIPATE IN STRENOUS SPORTS LIKE SWIMMING, SINGLES TENNIS, FOOTBALL, BASKETBALL, OR SKIING: NO
CAN YOU DO YARD WORK LIKE RAKING LEAVES, WEEDING OR PUSHING A MOWER: YES
CAN YOU WALK A BLOCK OR TWO ON LEVEL GROUND: YES
CAN YOU DO LIGHT WORK AROUND THE HOUSE LIKE DUSTING OR WASHING DISHES: YES
CAN YOU HAVE SEXUAL RELATIONS: YES
CAN YOU DO MODERATE WORK AROUND THE HOUSE LIKE VACUUMING, SWEEPING FLOORS OR CARRYING GROCERIES: YES

## 2025-04-21 ASSESSMENT — ENCOUNTER SYMPTOMS
CONSTITUTIONAL NEGATIVE: 1
NEUROLOGICAL NEGATIVE: 1
EYES NEGATIVE: 1
GASTROINTESTINAL NEGATIVE: 1
PSYCHIATRIC NEGATIVE: 1
CARDIOVASCULAR NEGATIVE: 1
MUSCULOSKELETAL NEGATIVE: 1

## 2025-04-21 NOTE — PREPROCEDURE INSTRUCTIONS
PAT DISCHARGE INSTRUCTIONS    Please call the Same Day Surgery (SDS) Department of the hospital where your procedure will be performed after 2:00 PM the day before your surgery. If you are scheduled on a Monday, or a Tuesday following a Monday holiday, you will need to call on the last business day prior to your surgery.    [facility]  [address], [address]  [phone]    [facility]  [address] [place] [address] [phone]    [facility]  [address]  [place] [id number]  [phone]    Please let your surgeon know if:      You develop any open sores, shingles, burning or painful urination as these may increase your risk of an infection.   You no longer wish to have the surgery.   Any other personal circumstances change that may lead to the need to cancel or defer this surgery-such as being sick or getting admitted to any hospital within one week of your planned procedure.    Your contact details change, such as a change of address or phone number.    Starting now:     Please DO NOT drink alcohol or smoke for 24 hours before surgery. It is well known that quitting smoking can make a huge difference to your health and recovery from surgery. The longer you abstain from smoking, the better your chances of a healthy recovery. If you need help with quitting, call 1-800-QUIT-NOW to be connected to a trained counselor who will discuss the best methods to help you quit.     Before your surgery:    Please stop all supplements 7 days prior to surgery. Or as directed by your surgeon.   Please stop taking NSAID pain medicine such as Advil and Motrin 7 days before surgery.    If you develop any fever, cough, cold, rashes, cuts, scratches, scrapes, urinary symptoms or infection anywhere on your body (including teeth and gums) prior to surgery, please call your surgeon’s office as soon as  possible. This may require treatment to reduce the chance of cancellation on the day of surgery.    The day before your surgery:   Get a good night’s rest.  Use the special soap for bathing if you have been instructed to use one.    Scheduled surgery times may change and you will be notified if this occurs - please check your personal voicemail for any updates.     On the morning of surgery:   Wear comfortable, loose fitting clothes which open in the front. Please do not wear moisturizers, creams, lotions, makeup or perfume.    Please bring with you to surgery:   Photo ID and insurance card   Current list of medicines and allergies   Pacemaker/ Defibrillator/Heart stent cards   CPAP machine and mask    Slings/ splints/ crutches   A copy of your complete advanced directive/DHPOA.    Please do NOT bring with you to surgery:   All jewelry and valuables should be left at home.   Prosthetic devices such as contact lenses, hearing aids, dentures, eyelash extensions, hairpins and body piercings must be removed prior to going in to the surgical suite.    After outpatient surgery:   A responsible adult MUST accompany you at the time of discharge and stay with you for 24 hours after your surgery. You may NOT drive yourself home after surgery.    Do not drive, operate machinery, make critical decisions or do activities that require co-ordination or balance until after a night’s sleep.   Do not drink alcoholic beverages for 24 hours.   Instructions for resuming your medications will be provided by your surgeon.    CALL YOUR DOCTOR AFTER SURGERY IF YOU HAVE:     Chills and/or a fever of 101° F or higher.    Redness, swelling, pus or drainage from your surgical wound or a bad smell from the wound.    Lightheadedness, fainting or confusion.    Persistent vomiting (throwing up) and are not able to eat or drink for 12 hours.    Three or more loose, watery bowel movements in 24 hours (diarrhea).   Difficulty or pain while urinating(  after non-urological surgery)    Pain and swelling in your legs, especially if it is only on one side.    Difficulty breathing or are breathing faster than normal.    Any new concerning symptoms.        Preoperative Fasting Guidelines    Why must I stop eating and drinking near surgery time?  With sedation, food or liquid in your stomach can enter your lungs causing serious complications  Increases nausea and vomiting    When do I need to stop eating and drinking before my surgery?  Do not eat any food after midnight the night before your surgery/procedure.  You may have up to 13 ounces of clear liquid until TWO hours before your instructed arrival time to the hospital.  This includes water, black tea/coffee, (no milk or cream) apple juice, and electrolyte drinks (Gatorade)  You may chew gum until TWO hours before your surgery/procedure     Medication List            Accurate as of April 21, 2025  8:47 AM. Always use your most recent med list.                * ACCU-CHEK AMIRAH MISC     * Accu-Chek Amirah Plus test strp  Generic drug: blood sugar diagnostic  USE 2 TIMES A DAY     atorvastatin 20 mg tablet  Commonly known as: Lipitor  Take 1 tablet (20 mg) by mouth once daily.  Medication Adjustments for Surgery: Take on the morning of surgery     cyanocobalamin 1,000 mcg tablet  Commonly known as: Vitamin B-12  Additional Medication Adjustments for Surgery: Take last dose 7 days before surgery     diclofenac sodium 1 % gel  Commonly known as: Voltaren  Apply 4.5 inches (4 g) topically 4 times a day.  Additional Medication Adjustments for Surgery: Take last dose 7 days before surgery     fluticasone 50 mcg/actuation nasal spray  Commonly known as: Flonase  Administer 1 spray into each nostril once daily.  Medication Adjustments for Surgery: Take/Use as prescribed     Gemtesa 75 mg tablet  Generic drug: vibegron  Take 1 tablet (75 mg) by mouth early in the morning..  Medication Adjustments for Surgery: Do Not take on the  morning of surgery     hydroCHLOROthiazide 25 mg tablet  Commonly known as: HYDRODiuril  TAKE ONE TABLET BY MOUTH DAILY  Medication Adjustments for Surgery: Take on the morning of surgery     * ketoconazole 2 % cream  Commonly known as: NIZOral  Medication Adjustments for Surgery: Take/Use as prescribed     * ketoconazole 2 % shampoo  Commonly known as: NIZOral  Medication Adjustments for Surgery: Take/Use as prescribed     lancets misc     levothyroxine 75 mcg tablet  Commonly known as: Synthroid, Levoxyl  Take 1 tablet (75 mcg) by mouth once daily.  Medication Adjustments for Surgery: Take on the morning of surgery     losartan 100 mg tablet  Commonly known as: Cozaar  Take 1 tablet (100 mg) by mouth once daily.  Medication Adjustments for Surgery: Take last dose 1 day (24 hours) before surgery     sertraline 50 mg tablet  Commonly known as: Zoloft  Medication Adjustments for Surgery: Take on the morning of surgery     SITagliptin phosphate 100 mg tablet  Commonly known as: Januvia  Take 1 tablet (100 mg) by mouth once daily.  Medication Adjustments for Surgery: Do Not take on the morning of surgery     sotalol 80 mg tablet  Commonly known as: Betapace  TAKE 1 TABLET BY MOUTH TWICE DAILY  Medication Adjustments for Surgery: Take on the morning of surgery     traZODone 100 mg tablet  Commonly known as: Desyrel  Take 1 tablet (100 mg) by mouth once daily.  Medication Adjustments for Surgery: Take/Use as prescribed  Notes to patient: CONTINUE PER USUAL/TAKE NIGHT BEFORE SURGERY     * warfarin 2.5 mg tablet  Commonly known as: Coumadin  Take as directed by the anticoagulation clinic. If you are unsure how to take this medication, talk to your nurse or doctor.  Original instructions: Take 1 tablet (2.5 mg) by mouth see administration instructions. 1 tablet oral Mondays and Thursdays only  Additional Medication Adjustments for Surgery: Other (Comment)  Notes to patient: Stop 4 days before surgery per cardiologist. Last  dose before procedure will be April 30     * warfarin 5 mg tablet  Commonly known as: Coumadin  Take as directed by the anticoagulation clinic. If you are unsure how to take this medication, talk to your nurse or doctor.  Original instructions: Take 1 tablet (5 mg) by mouth see administration instructions.  Additional Medication Adjustments for Surgery: Other (Comment)  Notes to patient: Stop 4 days before surgery per cardiologist. Last dose before procedure will be April 30     Wellbutrin  mg 24 hr tablet  Generic drug: buPROPion XL  Medication Adjustments for Surgery: Take on the morning of surgery           * This list has 6 medication(s) that are the same as other medications prescribed for you. Read the directions carefully, and ask your doctor or other care provider to review them with you.                    The medication bridging plan has been discussed with the surgeon and the patient has been informed of the plan.

## 2025-04-21 NOTE — H&P (VIEW-ONLY)
"CPM/PAT Evaluation       Name: Carolann Meyers (Carolann Meyers \"Sheron\")  /Age: 1950/75 y.o.     In-Person       Chief Complaint: Ductal carcinoma in situ (DCIS) of right breast     HPI  A 75-year-old female with multiple comorbidities and ductal carcinoma in situ (DCIS) of right breast.  History of abnormal mammogram-calcification 2024 status post abnormal breast US and MRI. Subsequent right breast breast biopsy revealed right breast ductal carcinoma in situ.  Denies fever, chills, chest pain, shortness of breath, syncope, breast pain, or nipple drainage.  She is scheduled for right breast lumpectomy with Magseed localization.     Medical History[1]    Surgical History[2]      Allergies[3]    Current Outpatient Medications   Medication Sig Dispense Refill    Accu-Chek Zayra Plus test strp strip USE 2 TIMES A  each 2    atorvastatin (Lipitor) 20 mg tablet Take 1 tablet (20 mg) by mouth once daily. 100 tablet 3    blood sugar diagnostic (ACCU-CHEK ZAYRA MISC) as directed external as directed for 1 year      buPROPion XL (Wellbutrin XL) 150 mg 24 hr tablet Take 1 tablet (150 mg) by mouth once daily in the morning.      cyanocobalamin (Vitamin B-12) 1,000 mcg tablet Take 1 tablet (1,000 mcg) by mouth once daily.      diclofenac sodium (Voltaren) 1 % gel Apply 4.5 inches (4 g) topically 4 times a day. 100 g 1    fluticasone (Flonase) 50 mcg/actuation nasal spray Administer 1 spray into each nostril once daily. 16 g 11    hydroCHLOROthiazide (HYDRODiuril) 25 mg tablet TAKE ONE TABLET BY MOUTH DAILY 90 tablet 3    ketoconazole (NIZOral) 2 % cream Apply topically once daily as needed.      ketoconazole (NIZOral) 2 % shampoo APPLY TOPICALLY TO SCALP 2 TO 3 TIMES A WEEK.      lancets misc Inject under the skin 2 times a day. As directed      levothyroxine (Synthroid, Levoxyl) 75 mcg tablet Take 1 tablet (75 mcg) by mouth once daily. 90 tablet 2    losartan (Cozaar) 100 mg tablet Take 1 tablet (100 mg) " by mouth once daily. 90 tablet 3    sertraline (Zoloft) 50 mg tablet Take 1 tablet (50 mg) by mouth once daily.      SITagliptin phosphate (Januvia) 100 mg tablet Take 1 tablet (100 mg) by mouth once daily. 90 tablet 3    sotalol (Betapace) 80 mg tablet TAKE 1 TABLET BY MOUTH TWICE DAILY 180 tablet 3    traZODone (Desyrel) 100 mg tablet Take 1 tablet (100 mg) by mouth once daily. 90 tablet 2    vibegron (Gemtesa) 75 mg tablet Take 1 tablet (75 mg) by mouth early in the morning.. 90 tablet 1    warfarin (Coumadin) 2.5 mg tablet Take 1 tablet (2.5 mg) by mouth see administration instructions. 1 tablet oral Mondays and Thursdays only 24 tablet 3    warfarin (Coumadin) 5 mg tablet Take 1 tablet (5 mg) by mouth see administration instructions. 90 tablet 3     No current facility-administered medications for this visit.          Review of Systems   Constitutional: Negative.    HENT: Negative.     Eyes: Negative.         Glasses   Respiratory:          Some ROBLERO   Cardiovascular: Negative.    Gastrointestinal: Negative.    Genitourinary: Negative.    Musculoskeletal: Negative.    Skin: Negative.    Neurological: Negative.    Psychiatric/Behavioral: Negative.          Physical Exam  Vitals reviewed.   HENT:      Head: Normocephalic and atraumatic.      Ears:      Comments: Bilateral hearing aids     Mouth/Throat:      Mouth: Mucous membranes are moist.   Eyes:      Pupils: Pupils are equal, round, and reactive to light.      Comments: glasses   Cardiovascular:      Rate and Rhythm: Normal rate and regular rhythm.   Pulmonary:      Effort: Pulmonary effort is normal.      Breath sounds: Normal breath sounds.   Abdominal:      Palpations: Abdomen is soft.   Musculoskeletal:         General: Normal range of motion.      Cervical back: Normal range of motion.   Skin:     General: Skin is warm and dry.   Neurological:      Mental Status: She is alert and oriented to person, place, and time.   Psychiatric:         Mood and Affect:  "Mood normal.          PAT AIRWAY:   Airway:     Mallampati::  II    Neck ROM::  Full  normal        /58   Pulse 74   Temp 35.9 °C (96.6 °F) (Temporal)   Resp 16   Ht 1.6 m (5' 3\")   Wt 78 kg (172 lb)   LMP  (LMP Unknown)   SpO2 100%   BMI 30.47 kg/m²     Stop Bang Score 3   CHADS: 4.0%  DASI risk score: 34.7  METS: 7  KALE: 0.07%  RCRI:0.4%  ASA: III  ARISCAT:1.6 score   PAT orders CBC, BMP, EKG-normal sinus rhythm, low voltage QRS (preliminary)    Assessment and Plan:     Ductal carcinoma in situ (DCIS) of right breast Plan: Right breast lumpectomy with Magseed localization  History of SVT/atrial fibrillation takes sotalol, warfarin-last dose scheduled 4/30/2025 prior to surgery per Dr. Priest cardiologist  Hypertension takes losartan, hydrochlorothiazide  Hypothyroidism managed with levothyroxine  Diabetes type 2 managed with Januvia  OAB takes Gemtesa  Mitral valve disorder  Hyperlipidemia takes atorvastatin  Anxiety managed with Wellbutrin, sertraline, trazodone  Sleep apnea-uses cpap  BMI 31.46             [1]   Past Medical History:  Diagnosis Date    Allergic     Arthritis 2024    Depression 1995    Diabetes mellitus (Multi) 2012    Eczema 2020    HL (hearing loss) 2018”    Hypertension 2023’    Personal history of other diseases of the circulatory system 08/20/2021    History of atrial fibrillation    Personal history of other diseases of the circulatory system 08/20/2021    History of hypertension    Personal history of other endocrine, nutritional and metabolic disease 08/20/2021    History of type 2 diabetes mellitus    Personal history of other mental and behavioral disorders 08/20/2021    History of depression    Urinary tract infection    [2]   Past Surgical History:  Procedure Laterality Date    BREAST BIOPSY Right 02/25/2025    CORONARY ARTERY BYPASS GRAFT      HYSTERECTOMY  1998    OTHER STRABISMUS SURGERY Bilateral 11/2023    cataracts    OTHER SURGICAL HISTORY  08/20/2021    " Hemithyroidectomy    OTHER SURGICAL HISTORY  08/20/2021    Hysterectomy    TONSILLECTOMY  1958    WISDOM TOOTH EXTRACTION  1968   [3]   Allergies  Allergen Reactions    Nitrofurantoin Macrocrystal Unknown    Latex Itching    Tioconazole Unknown

## 2025-04-22 LAB
ATRIAL RATE: 74 BPM
P AXIS: 28 DEGREES
P OFFSET: 200 MS
P ONSET: 144 MS
PR INTERVAL: 136 MS
Q ONSET: 212 MS
QRS COUNT: 12 BEATS
QRS DURATION: 86 MS
QT INTERVAL: 434 MS
QTC CALCULATION(BAZETT): 481 MS
QTC FREDERICIA: 465 MS
R AXIS: 2 DEGREES
T AXIS: 84 DEGREES
T OFFSET: 429 MS
VENTRICULAR RATE: 74 BPM

## 2025-05-04 ENCOUNTER — ANESTHESIA EVENT (OUTPATIENT)
Dept: OPERATING ROOM | Facility: HOSPITAL | Age: 75
End: 2025-05-04
Payer: MEDICARE

## 2025-05-04 SDOH — HEALTH STABILITY: MENTAL HEALTH: CURRENT SMOKER: 0

## 2025-05-04 NOTE — ANESTHESIA PREPROCEDURE EVALUATION
"Patient: Carolann Meyers \"Sheron\"    Procedure Information       Date/Time: 25 0800    Procedure: Lumpectomy w/ Magseed Localization (Right)    Location: [facility] /  [facility]    Surgeons: Vicky Mascorro MD            Relevant Problems   Cardiac   (+) Hyperlipidemia   (+) Hypertension   (+) Mitral valve disorder   (+) Paroxysmal atrial fibrillation (Multi)   (+) SVT (supraventricular tachycardia) (CMS[facility])      Neuro   (+) Anxiety   (+) Daily headache   (+) Depressed      Endocrine   (+) Diabetes mellitus without complication   (+) Hypothyroid      Respiratory   (+) Sleep apnea         Clinical information reviewed:   Tobacco  Allergies  Meds   Med Hx  Surg Hx   Fam Hx          NPO Detail:  No data recorded     Physical Exam    Airway  Mallampati: IV  TM distance: <3 FB  Neck ROM: limited  Mouth openin finger width     Cardiovascular    Dental    Pulmonary    Abdominal            Anesthesia Plan    History of general anesthesia?: yes  History of complications of general anesthesia?: no    ASA 3     general     The patient is not a current smoker.  Education provided regarding risk of obstructive sleep apnea.  intravenous induction   Anesthetic plan and risks discussed with patient.    Plan discussed with CAA.      "

## 2025-05-05 ENCOUNTER — PHARMACY VISIT (OUTPATIENT)
Dept: PHARMACY | Facility: CLINIC | Age: 75
End: 2025-05-05
Payer: MEDICARE

## 2025-05-05 ENCOUNTER — HOSPITAL ENCOUNTER (OUTPATIENT)
Dept: RADIOLOGY | Facility: HOSPITAL | Age: 75
Discharge: HOME | End: 2025-05-05
Payer: MEDICARE

## 2025-05-05 ENCOUNTER — HOSPITAL ENCOUNTER (OUTPATIENT)
Facility: HOSPITAL | Age: 75
Setting detail: OUTPATIENT SURGERY
Discharge: HOME | End: 2025-05-05
Attending: SURGERY | Admitting: SURGERY
Payer: MEDICARE

## 2025-05-05 ENCOUNTER — ANESTHESIA (OUTPATIENT)
Dept: OPERATING ROOM | Facility: HOSPITAL | Age: 75
End: 2025-05-05
Payer: MEDICARE

## 2025-05-05 VITALS
DIASTOLIC BLOOD PRESSURE: 57 MMHG | RESPIRATION RATE: 16 BRPM | HEIGHT: 63 IN | OXYGEN SATURATION: 94 % | WEIGHT: 172 LBS | BODY MASS INDEX: 30.48 KG/M2 | TEMPERATURE: 98.2 F | SYSTOLIC BLOOD PRESSURE: 101 MMHG | HEART RATE: 63 BPM

## 2025-05-05 DIAGNOSIS — D05.11 DUCTAL CARCINOMA IN SITU (DCIS) OF RIGHT BREAST: Primary | ICD-10-CM

## 2025-05-05 DIAGNOSIS — D05.11 DUCTAL CARCINOMA IN SITU (DCIS) OF RIGHT BREAST: ICD-10-CM

## 2025-05-05 LAB
GLUCOSE BLD MANUAL STRIP-MCNC: 130 MG/DL (ref 74–99)
INR PPP: 1.3 (ref 0.9–1.2)
PROTHROMBIN TIME: 13.3 SECONDS (ref 9.3–12.7)

## 2025-05-05 PROCEDURE — 82947 ASSAY GLUCOSE BLOOD QUANT: CPT

## 2025-05-05 PROCEDURE — 3600000009 HC OR TIME - EACH INCREMENTAL 1 MINUTE - PROCEDURE LEVEL FOUR: Performed by: SURGERY

## 2025-05-05 PROCEDURE — 2500000004 HC RX 250 GENERAL PHARMACY W/ HCPCS (ALT 636 FOR OP/ED): Performed by: SURGERY

## 2025-05-05 PROCEDURE — 7100000001 HC RECOVERY ROOM TIME - INITIAL BASE CHARGE: Performed by: SURGERY

## 2025-05-05 PROCEDURE — 85610 PROTHROMBIN TIME: CPT | Performed by: ANESTHESIOLOGY

## 2025-05-05 PROCEDURE — 3700000001 HC GENERAL ANESTHESIA TIME - INITIAL BASE CHARGE: Performed by: SURGERY

## 2025-05-05 PROCEDURE — 36415 COLL VENOUS BLD VENIPUNCTURE: CPT | Performed by: ANESTHESIOLOGY

## 2025-05-05 PROCEDURE — 2720000007 HC OR 272 NO HCPCS: Performed by: SURGERY

## 2025-05-05 PROCEDURE — 2500000004 HC RX 250 GENERAL PHARMACY W/ HCPCS (ALT 636 FOR OP/ED): Mod: JZ | Performed by: ANESTHESIOLOGIST ASSISTANT

## 2025-05-05 PROCEDURE — A19301 PR MASTECTOMY, PARTIAL: Performed by: ANESTHESIOLOGIST ASSISTANT

## 2025-05-05 PROCEDURE — A19301 PR MASTECTOMY, PARTIAL: Performed by: ANESTHESIOLOGY

## 2025-05-05 PROCEDURE — 99100 ANES PT EXTEME AGE<1 YR&>70: CPT | Performed by: ANESTHESIOLOGY

## 2025-05-05 PROCEDURE — 7100000009 HC PHASE TWO TIME - INITIAL BASE CHARGE: Performed by: SURGERY

## 2025-05-05 PROCEDURE — 19301 PARTIAL MASTECTOMY: CPT | Performed by: SURGERY

## 2025-05-05 PROCEDURE — 76098 X-RAY EXAM SURGICAL SPECIMEN: CPT

## 2025-05-05 PROCEDURE — 3600000004 HC OR TIME - INITIAL BASE CHARGE - PROCEDURE LEVEL FOUR: Performed by: SURGERY

## 2025-05-05 PROCEDURE — A4649 SURGICAL SUPPLIES: HCPCS | Performed by: SURGERY

## 2025-05-05 PROCEDURE — 7100000002 HC RECOVERY ROOM TIME - EACH INCREMENTAL 1 MINUTE: Performed by: SURGERY

## 2025-05-05 PROCEDURE — 7100000010 HC PHASE TWO TIME - EACH INCREMENTAL 1 MINUTE: Performed by: SURGERY

## 2025-05-05 PROCEDURE — 3700000002 HC GENERAL ANESTHESIA TIME - EACH INCREMENTAL 1 MINUTE: Performed by: SURGERY

## 2025-05-05 PROCEDURE — 2500000002 HC RX 250 W HCPCS SELF ADMINISTERED DRUGS (ALT 637 FOR MEDICARE OP, ALT 636 FOR OP/ED): Performed by: ANESTHESIOLOGY

## 2025-05-05 PROCEDURE — RXMED WILLOW AMBULATORY MEDICATION CHARGE

## 2025-05-05 PROCEDURE — 88307 TISSUE EXAM BY PATHOLOGIST: CPT | Mod: TC,TRILAB,WESLAB | Performed by: SURGERY

## 2025-05-05 PROCEDURE — 76098 X-RAY EXAM SURGICAL SPECIMEN: CPT | Performed by: RADIOLOGY

## 2025-05-05 RX ORDER — ONDANSETRON HYDROCHLORIDE 2 MG/ML
INJECTION, SOLUTION INTRAVENOUS AS NEEDED
Status: DISCONTINUED | OUTPATIENT
Start: 2025-05-05 | End: 2025-05-05

## 2025-05-05 RX ORDER — ALBUTEROL SULFATE 0.83 MG/ML
2.5 SOLUTION RESPIRATORY (INHALATION) ONCE AS NEEDED
Status: COMPLETED | OUTPATIENT
Start: 2025-05-05 | End: 2025-05-05

## 2025-05-05 RX ORDER — PROPOFOL 10 MG/ML
INJECTION, EMULSION INTRAVENOUS AS NEEDED
Status: DISCONTINUED | OUTPATIENT
Start: 2025-05-05 | End: 2025-05-05

## 2025-05-05 RX ORDER — BUPIVACAINE HYDROCHLORIDE 5 MG/ML
INJECTION, SOLUTION PERINEURAL AS NEEDED
Status: DISCONTINUED | OUTPATIENT
Start: 2025-05-05 | End: 2025-05-05 | Stop reason: HOSPADM

## 2025-05-05 RX ORDER — LIDOCAINE HYDROCHLORIDE 20 MG/ML
INJECTION, SOLUTION INFILTRATION; PERINEURAL AS NEEDED
Status: DISCONTINUED | OUTPATIENT
Start: 2025-05-05 | End: 2025-05-05

## 2025-05-05 RX ORDER — LIDOCAINE HYDROCHLORIDE AND EPINEPHRINE 10; 10 UG/ML; MG/ML
INJECTION, SOLUTION INFILTRATION; PERINEURAL AS NEEDED
Status: DISCONTINUED | OUTPATIENT
Start: 2025-05-05 | End: 2025-05-05 | Stop reason: HOSPADM

## 2025-05-05 RX ORDER — HYDROCODONE BITARTRATE AND ACETAMINOPHEN 5; 325 MG/1; MG/1
1 TABLET ORAL EVERY 6 HOURS PRN
Qty: 20 TABLET | Refills: 0 | Status: SHIPPED | OUTPATIENT
Start: 2025-05-05

## 2025-05-05 RX ORDER — FENTANYL CITRATE 50 UG/ML
25 INJECTION, SOLUTION INTRAMUSCULAR; INTRAVENOUS EVERY 5 MIN PRN
Status: DISCONTINUED | OUTPATIENT
Start: 2025-05-05 | End: 2025-05-05 | Stop reason: HOSPADM

## 2025-05-05 RX ORDER — FENTANYL CITRATE 50 UG/ML
INJECTION, SOLUTION INTRAMUSCULAR; INTRAVENOUS AS NEEDED
Status: DISCONTINUED | OUTPATIENT
Start: 2025-05-05 | End: 2025-05-05

## 2025-05-05 RX ORDER — ONDANSETRON HYDROCHLORIDE 2 MG/ML
4 INJECTION, SOLUTION INTRAVENOUS ONCE AS NEEDED
Status: DISCONTINUED | OUTPATIENT
Start: 2025-05-05 | End: 2025-05-05 | Stop reason: HOSPADM

## 2025-05-05 RX ORDER — SODIUM CHLORIDE, SODIUM LACTATE, POTASSIUM CHLORIDE, CALCIUM CHLORIDE 600; 310; 30; 20 MG/100ML; MG/100ML; MG/100ML; MG/100ML
100 INJECTION, SOLUTION INTRAVENOUS CONTINUOUS
Status: DISCONTINUED | OUTPATIENT
Start: 2025-05-05 | End: 2025-05-05 | Stop reason: HOSPADM

## 2025-05-05 RX ORDER — CEFAZOLIN SODIUM 2 G/100ML
2 INJECTION, SOLUTION INTRAVENOUS ONCE
Status: COMPLETED | OUTPATIENT
Start: 2025-05-05 | End: 2025-05-05

## 2025-05-05 RX ORDER — MEPERIDINE HYDROCHLORIDE 25 MG/ML
12.5 INJECTION INTRAMUSCULAR; INTRAVENOUS; SUBCUTANEOUS EVERY 10 MIN PRN
Status: DISCONTINUED | OUTPATIENT
Start: 2025-05-05 | End: 2025-05-05 | Stop reason: HOSPADM

## 2025-05-05 RX ADMIN — PROPOFOL 150 MG: 10 INJECTION, EMULSION INTRAVENOUS at 08:24

## 2025-05-05 RX ADMIN — FENTANYL CITRATE 50 MCG: 50 INJECTION, SOLUTION INTRAMUSCULAR; INTRAVENOUS at 08:49

## 2025-05-05 RX ADMIN — LIDOCAINE HYDROCHLORIDE 50 MG: 20 INJECTION, SOLUTION INFILTRATION; PERINEURAL at 08:24

## 2025-05-05 RX ADMIN — CEFAZOLIN SODIUM 2 G: 2 INJECTION, SOLUTION INTRAVENOUS at 08:30

## 2025-05-05 RX ADMIN — FENTANYL CITRATE 50 MCG: 50 INJECTION, SOLUTION INTRAMUSCULAR; INTRAVENOUS at 08:34

## 2025-05-05 RX ADMIN — ONDANSETRON 4 MG: 2 INJECTION, SOLUTION INTRAMUSCULAR; INTRAVENOUS at 09:00

## 2025-05-05 RX ADMIN — SODIUM CHLORIDE, POTASSIUM CHLORIDE, SODIUM LACTATE AND CALCIUM CHLORIDE: 600; 310; 30; 20 INJECTION, SOLUTION INTRAVENOUS at 08:15

## 2025-05-05 RX ADMIN — ALBUTEROL SULFATE 2.5 MG: 2.5 SOLUTION RESPIRATORY (INHALATION) at 09:28

## 2025-05-05 RX ADMIN — DEXAMETHASONE SODIUM PHOSPHATE 4 MG: 4 INJECTION, SOLUTION INTRAMUSCULAR; INTRAVENOUS at 08:34

## 2025-05-05 ASSESSMENT — PAIN - FUNCTIONAL ASSESSMENT
PAIN_FUNCTIONAL_ASSESSMENT: 0-10
PAIN_FUNCTIONAL_ASSESSMENT: FLACC (FACE, LEGS, ACTIVITY, CRY, CONSOLABILITY)
PAIN_FUNCTIONAL_ASSESSMENT: 0-10

## 2025-05-05 ASSESSMENT — PAIN SCALES - GENERAL
PAINLEVEL_OUTOF10: 0 - NO PAIN

## 2025-05-05 NOTE — ANESTHESIA POSTPROCEDURE EVALUATION
"Patient: Carolann Meyers \"Sheron\"    Procedure Summary       Date: 05/05/25 Room / Location: [facility] / [facility]    Anesthesia Start: 0815 Anesthesia Stop: 0924    Procedure: Lumpectomy w/ Magseed Localization (Right) Diagnosis:       Ductal carcinoma in situ (DCIS) of right breast      (Ductal carcinoma in situ (DCIS) of right breast [D05.11])    Surgeons: Vicky Mascorro MD Responsible Provider: Brooke De Santiago MD    Anesthesia Type: general ASA Status: 3            Anesthesia Type: general    Vitals Value Taken Time   /65 05/05/25 09:51   Temp 36.2 °C (97.2 °F) 05/05/25 09:11   Pulse 61 05/05/25 09:53   Resp 11 05/05/25 09:53   SpO2 95 % 05/05/25 09:53   Vitals shown include unfiled device data.    Anesthesia Post Evaluation    Patient location during evaluation: PACU  Patient participation: complete - patient participated  Level of consciousness: awake and alert  Pain management: adequate  Multimodal analgesia pain management approach  Airway patency: patent  Two or more strategies used to mitigate risk of obstructive sleep apnea  Cardiovascular status: acceptable  Respiratory status: acceptable  Hydration status: acceptable  Postoperative Nausea and Vomiting: none        Encounter Notable Events   Notable Event Outcome Phase Comment   Prolonged desaturation Resolved Out of Room Intraprocedure Patient obstructed en route to PACU.  Jaw thrust to resume normal post-op oxygen saturation.  Patient reponsive to commands and maintaining normal saturation before I left the PACU area.       "

## 2025-05-05 NOTE — PERIOPERATIVE NURSING NOTE
1019- Arrived to hospitals 11 via cart with RN. Alert, denies pain and/or nausea. VSS. Dressing to right breast dry and intact, surgibra in place. Set up with drink and snack, call light within reach, spouse to bedside. Rx To Go updated on pt status.   1040- Tolerating PO. No change in surgical site. Awaiting discharge medication.  1100- VSS. Dressing to right breast remains clean and dry, no swelling. Reviewed discharge instructions, questions answered. Encouraged to wear CPAP even when napping on the couch, use IS. Pt previously instructed on IS in PACU. Discharge medications to bedside. Discharged home

## 2025-05-05 NOTE — OP NOTE
"Lumpectomy w/ Magseed Localization (R) Operative Note     Date: 2025  OR Location: [facility]    Name: Carolann Meyers \"Sheron\", : 1950, Age: 75 y.o., MRN: [id number], Sex: female    Diagnosis  Pre-op Diagnosis      * Ductal carcinoma in situ (DCIS) of right breast [D05.11] Post-op Diagnosis     * Ductal carcinoma in situ (DCIS) of right breast [D05.11]     Procedures  Lumpectomy w/ Magseed Localization  50716 - [place] MASTECTOMY PARTIAL      Surgeons      * Vicky Mascorro - Primary    Resident/Fellow/Other Assistant:  Surgeons and Role:     * JAIME Hilario-C - BREE First Assist    Staff:   Dinaulator: Samson Gallardo Person: Weston    Anesthesia Staff: Anesthesiologist: Brooke De Santiago MD  C-AA: RENAN Olivares    Procedure Summary  Anesthesia: General  ASA: III  Estimated Blood Loss: 2mL  Intra-op Medications:   Administrations occurring from 0800 to 0930 on 25:   Medication Name Total Dose   BUPivacaine HCl (Marcaine) 0.5 % (5 mg/mL) injection 3 mL   lidocaine-epinephrine (Xylocaine W/EPI) 1 %-1:100,000 injection 3 mL   albuterol 2.5 mg /3 mL (0.083 %) nebulizer solution 2.5 mg 2.5 mg   ceFAZolin (Ancef) 2 g in dextrose (iso)  mL 2 g   dexAMETHasone (Decadron) 4 mg/mL IV Syringe 2 mL 4 mg   fentaNYL (Sublimaze) injection 50 mcg/mL 100 mcg   LR bolus Cannot be calculated   lidocaine (Xylocaine) injection 2 % 50 mg   ondansetron (Zofran) 2 mg/mL injection 4 mg   propofol (Diprivan) injection 10 mg/mL 150 mg              Anesthesia Record               Intraprocedure I/O Totals          Intake    LR bolus 450.00 mL    Total Intake 450 mL          Specimen:   ID Type Source Tests Collected by Time   1 : Right Breast lumpectomy, Black stiches addie short superior, Long lateral, blue stitch marks anterior Tissue BREAST LUMPECTOMY RIGHT SURGICAL PATHOLOGY EXAM Vicky Mascorro MD 2025 0839   2 : Anterior skin margin, blue stich marks tumor site, long stitch marks superior, short marks inferior " Tissue SKIN BIOPSY SURGICAL PATHOLOGY EXAM Vicky Mascorro MD 5/5/2025 0897                 Drains and/or Catheters: * None in log *    Tourniquet Times:         Implants:     Findings: Radiographic confirmation of excision of the affected area    The patient was seen in the preoperative area. The risks, benefits, complications, treatment options, non-operative alternatives, expected recovery and outcomes were discussed with the patient. The possibilities of reaction to medication, pulmonary aspiration, injury to surrounding structures, bleeding, recurrent infection, the need for additional procedures, failure to diagnose a condition, and creating a complication requiring transfusion or operation were discussed with the patient. The patient concurred with the proposed plan, giving informed consent.  The site of surgery was properly noted/marked if necessary per policy. The patient has been actively warmed in preoperative area. Preoperative antibiotics have been ordered and given within 1 hours of incision. Venous thrombosis prophylaxis have been ordered including bilateral sequential compression devices    Procedure Details: Patient was brought to Room in the supine position after undergoingmagseed placement  into the right breast on a previous day. General anesthesia was obtained with LMA. Breast was prepped and draped in a sterile fashion.  Operation then continued with lumpectomy.  Mag seed probe was placed on the breast.  We are able to localize the area of the mag seed.  In the lateral slightly inferior quadrant.  Marking pen was used to delineate the line of incision in a semicircular fashion in the lateral slightly lower quadrant of the right breast. Local was infiltrated in the tissue.A first assistant needed to achieve adequate exposure during the dissection.  Fifteen blade scalpel was used to make a 4 centimeter incision in the skin.  The mag seed probe was placed into the incision and the direction of  the seed was determined.  Seed was very close to the tissue just below the dermis as a result I took the skin as a separate set specimen over the mass.  This was marked with a long stitch inferior short stitch superior and blue stitch right where the tumor was.  Lesion then in the breast tissue was excised using electrocautery.  The mag seed probe was used quite often to check the location of the seed during the excision.  After it was excised it was marked appropriately with a short stitch superior and long stitch lateral and blue stitch anterior it was sent to the Radiology Department who confirmed the presence of the affected area. The lumpectomy cavity was then irrigated with water. Hemostasis maintained with electrocautery. Incision was then closed using interrupted 3-0 Vicryl followed by a running 4-0 Monocryl. Steri-Strips and sterile dressings were applied patient tolerated procedure well LMA was removed in the operating room and she was transferred to recovery room with rails up all counts were good this concludes this dictation please send a copy to myself and to the patients primary care physician.    Evidence of Infection: No   Complications:  None; patient tolerated the procedure well.    Disposition: PACU - hemodynamically stable.  Condition: stable             Task Performed by BREE First Assist or Physician Assistant:   Matteo SANDOVAL/NP, was necessary to assist on this case due to the nature of the case and difficulty. During the case he served as my assist by exposure      Additional Details:     Attending Attestation: I was present and scrubbed for the entire procedure.    Vicky Mascorro  Phone Number: [phone]

## 2025-05-05 NOTE — ANESTHESIA PROCEDURE NOTES
Airway  Date/Time: 5/5/2025 8:26 AM  Reason: elective    Airway not difficult    Staffing  Performed: RENAN   Authorized by: Brooke De Santiago MD    Performed by: RENAN Olivares  Patient location during procedure: OR    Patient Condition  Indications for airway management: anesthesia  Patient position: sniffing  Planned trial extubation  Sedation level: deep     Final Airway Details   Preoxygenated: yes  Final airway type: supraglottic airway  Successful airway: classic  Size: 4  Number of attempts at approach: 1    Additional Comments  Lubricated LMA placed with ease and to good seal. Lips and teeth in pre-anesthetic condition.

## 2025-05-06 ASSESSMENT — PAIN SCALES - GENERAL: PAINLEVEL_OUTOF10: 0 - NO PAIN

## 2025-05-09 ENCOUNTER — TELEPHONE (OUTPATIENT)
Dept: CARDIOLOGY | Facility: CLINIC | Age: 75
End: 2025-05-09
Payer: MEDICARE

## 2025-05-09 NOTE — TELEPHONE ENCOUNTER
Received a call from [employer] regarding an out-of-range remote INR reading (INR: 1.2). A voicemail was left for the patient requesting their current dosage information.

## 2025-05-13 ENCOUNTER — TELEPHONE (OUTPATIENT)
Facility: CLINIC | Age: 75
End: 2025-05-13
Payer: MEDICARE

## 2025-05-13 NOTE — TELEPHONE ENCOUNTER
INR was decreased secondary to stopping the Coumadin prior to her breast surgery.  She restarted Coumadin on Thursday of last week and will have her check INR this week and we will make adjustments based on that reading.  Patient called and instructions provided.

## 2025-05-13 NOTE — TELEPHONE ENCOUNTER
Patient called the office to let you know her INR 1.3 on 5/5/25, she is asking what dosage of coumadin she should take?  Please advise, thanks  Call back [phone]

## 2025-05-14 NOTE — PROGRESS NOTES
"Subjective   Patient ID: Carolann Meyers \"Sheron\" is a 75 y.o. female who presents for s/p right breast lumpectomy with magseed localization on 05/05/2025.    HPI  Patient returns to the clinic for s/p right breast lumpectomy with magseed localization done on 05/05/2025.    Findings: Radiographic confirmation of excision of the affected area   Surgical pathology was collected and finalized.    FINAL DIAGNOSIS      A. RIGHT BREAST, LUMPECTOMY:              Residual ductal carcinoma in situ, solid type, intermediate to high nuclear grade, with necrosis, see comment.              Atypical intraductal hyperplasia.              Sclerosing adenosis, duct ectasia, apocrine change and intraductal hyperplasia, usual type.              Findings related to previous biopsy site.     Comment: A focus of residual DCIS is present at the previous biopsy site and measures 3 mm. The margins of excision are free of DCIS. Invasive carcinoma is not seen.     B. SKIN OF RIGHT BREAST, EXCISION:              Scar, negative for malignancy.     Consultation with concurrence: Marika Galeana M.D.   Electronically signed by Parminder Bolanos MD on 5/15/2025 at 1143 T      [facility]   By the signature on this report, the individual or group listed as making the Final Interpretation/Diagnosis certifies that they have reviewed this case.    Case Summary Report   DCIS OF THE BREAST: Resection   8th Edition - Protocol posted: 3/23/2022DCIS OF THE BREAST: RESECTION - A  SPECIMEN   Procedure  Excision (less than total mastectomy)   Specimen Laterality  Right   TUMOR   Tumor Site  Not specified   Histologic Type  Ductal carcinoma in situ   Size (Extent) of DCIS  Estimated size (extent) of DCIS is at least (Millimeters): 3 mm   Number of Blocks with DCIS  1   Number of Blocks Examined  17   Architectural Patterns  Solid   Nuclear Grade  Grade II (intermediate)   Necrosis  Present, central (expansive \"comedo\" necrosis)   Microcalcifications  Present in " nonneoplastic tissue   MARGINS   Margin Status  All margins negative for DCIS   Distance from DCIS to Closest Margin  6 mm   Closest Margin(s) to DCIS  Anterior   Distance from DCIS to Anterior Margin  6 mm   Distance from DCIS to Posterior Margin  15 mm   Distance from DCIS to Superior Margin  Greater than: 2 mm   Distance from DCIS to Inferior Margin  Greater than: 2 mm   Distance from DCIS to Medial Margin  11 mm   Distance from DCIS to Lateral Margin  Greater than: 2 mm   REGIONAL LYMPH NODES   Regional Lymph Node Status  Not applicable (no regional lymph nodes submitted or found)   PATHOLOGIC STAGE CLASSIFICATION (pTNM, AJCC 8th Edition)   Reporting of pT, pN, and (when applicable) pM categories is based on information available to the pathologist at the time the report is issued. As per the AJCC (Chapter 1, 8th Ed.) it is the managing physician’s responsibility to establish the final pathologic stage based upon all pertinent information, including but potentially not limited to this pathology report.   pT Category  pTis (DCIS)   pN Category  pN not assigned (no nodes submitted or found)   ADDITIONAL FINDINGS   Additional Findings  Atypical intraductal hyperplasia, duct ectasia, apocrine change and usual intraductal hyperplasia; findings related to previous biopsy site   Breast Biomarker Testing Performed on Previous Biopsy     Estrogen Receptor (ER) Status  Positive   Percentage of Cells with Nuclear Positivity  >95 %   Testing Performed on Case Number  [id number]   .      Block for Additional Biomarkers/Molecular Studies  [facility] [address]   Normal Block: A1  Tumor Block: A8       Social History:  Tobacco Use - NO  Do you use any recreational drugs - NO  Alcohol Use - YES  Caffeine Intake - NO  Working - RETIRED  Marital status -   Living with -SPOUSE    Past Medical History:   Diagnosis Date    Allergic     Arthritis 2024    Depression 1995    Diabetes mellitus (Multi) 2012    Eczema 2020    HL (hearing loss)  "2018”    Hypertension 2023’    Personal history of other diseases of the circulatory system 08/20/2021    History of atrial fibrillation    Personal history of other diseases of the circulatory system 08/20/2021    History of hypertension    Personal history of other endocrine, nutritional and metabolic disease 08/20/2021    History of type 2 diabetes mellitus    Personal history of other mental and behavioral disorders 08/20/2021    History of depression    Urinary tract infection      Past Surgical History:   Procedure Laterality Date    BREAST BIOPSY Right 02/25/2025    BREAST LUMPECTOMY W/ NEEDLE LOCALIZATION Right 05/05/2025    HYSTERECTOMY  1998    OTHER STRABISMUS SURGERY Bilateral 11/2023    cataracts    OTHER SURGICAL HISTORY  08/20/2021    Hemithyroidectomy    OTHER SURGICAL HISTORY  08/20/2021    Hysterectomy    TONSILLECTOMY  1958    WISDOM TOOTH EXTRACTION  1968     Family History   Problem Relation Name Age of Onset    Other (hay fever) Mother Darlene Clinton     Arthritis Mother Darlene Clinton     Mental illness Mother Darlene Clinton     Depression Mother Darlene Clinton     Hearing loss Mother Darlene Clinton     Arthritis Father Channing Clinton     Cancer Father Channing Clinton     Diabetes Father Channing Clinton     Heart disease Father Channing Clinton     Hearing loss Father Channing Clinton     Diabetes Brother Ruy Clinton     Atrial fibrillation Brother Ruy Clinton     Hearing loss Brother Ruy Clinton     Other (hay fever) Son      Diabetes Other siblings     Heart disease Other siblings     Alcohol abuse Brother Maximilian Clinton     Diabetes Brother Maximilian Clinton     Hearing loss Brother Maximilian Clinton     Hypertension Brother Maximilian Clinton      Allergies   Allergen Reactions    Nitrofurantoin Macrocrystal Unknown    Latex Itching    Tioconazole Unknown       Review of Systems  /65   Pulse 68   Ht 1.6 m (5' 3\")   Wt 78 kg (172 lb)   LMP  (LMP Unknown)   SpO2 96%   BMI 30.47 " kg/m²     Objective   Physical Exam  Right breast incision clean dry and intact.  Assessment/Plan   Problem List Items Addressed This Visit           ICD-10-CM    Ductal carcinoma in situ (DCIS) of breast - Primary D05.10    Patient status post Magseed localization lumpectomy for small DCIS.  It only measured 3 mm.  We have clean margins at least 6 mm.  Patient being referred to oncology for discussion of endocrine therapy.  If she chooses not to do endocrine therapy should even be a candidate for radiation therapy.  Patient to follow-up with me in 6 months for repeat breast check.             Breast History:     Patient is new to the clinic and presents today to discuss a breast biopsy due to microcalcification of right breast on mammography on 12/23/2024.  Patient is referred to the clinic by MONICA Whitmore.   Patient to be scheduled for stereotactic biopsy of the breast in the radiology department. Risk, benefits and alternatives to this plan were thoroughly discussed with the patient who agrees to proceed.  The procedure was also thoroughly discussed as well as her follow-up. She is to see me in the office in one week but I also will call as soon as I see the pathology which is usually 4 working days from procedure.   Patient returns to clinic and presents for discussion of right breast biopsy results, bx on 2/25.  Patient was last seen in clinic on 2/11/2025 for breast biopsy consult.  Surgical pathology was collected and finalized.  kirit is a 75-year-old female with a DCIS right breast measuring approximately 9 mm. At this time recommend Magseed localization and lumpectomy. Other options include mastectomy which was not recommended. Could be with or without immediate reconstruction. Due to the noninvasive nature of the tumor lymph nodes do not need to be assessed. Patient was informed if this continues to be a noninvasive cancer there is a high probability radiation will not be recommended although  it may be discussed. Patient was also informed endocrine therapy would likely be recommended. The risk benefits alternatives of proceeding with Magseed localization and lumpectomy right breast were thoroughly discussed with the patient who agrees to proceed   Made aware she needs to stop her blood thinners 4 days prior to biopsy day  Previous Biopsy: NO Menarche Age: 10 Age of first delivery: 23 Breastfeed: YES Menopause age: 49 HRT: NO Family history of breast cancer: NO Family history of ovarian cancer: NO Family history of pancreatic cancer: NO G 1 P 1     Patient returns to clinic and presents for  follow up visit-discuss surgery and MRI results.  Patient was last seen in clinic on 3/11/2025 for discussion of right breast biopsy results, bx on 2/25.   Patient had MRI breast bilateral w contrast full protocol completed on 3/25/2025. Impression was Focal non-mass enhancement in the central lateral right breast anterior depth corresponding to the patient's biopsy-proven carcinoma measuring 1.2 x 1.6 x 1.3 cm. No evidence of nipple or chest wall involvement. No lymphadenopathy or additional suspicious areas of enhancement are non mass enhancement in the right breast. No MRI evidence of malignancy in the left breast.     Patient with DCIS of the right breast.  Recommend Magseed localization lumpectomy.  On MRI the area appears to be approximately 1.6 cm.  Risk-benefit alternatives of doing the procedure were thoroughly discussed with the patient agrees to proceed.  Patient was made aware there is no need to investigate lymph nodes in this noninvasive cancer.  Patient will ultimately be recommended to have endocrine therapy postoperatively.  Today we discussed how her surgical day would proceed and I answered any questions she may have       Vicky Mascorro MD 05/20/25 1:28 PM

## 2025-05-15 LAB
LAB AP BLOCK FOR ADDITIONAL STUDIES: NORMAL
LABORATORY COMMENT REPORT: NORMAL
PATH REPORT.FINAL DX SPEC: NORMAL
PATH REPORT.GROSS SPEC: NORMAL
PATH REPORT.RELEVANT HX SPEC: NORMAL
PATH REPORT.TOTAL CANCER: NORMAL
PATHOLOGY SYNOPTIC REPORT: NORMAL

## 2025-05-20 ENCOUNTER — OFFICE VISIT (OUTPATIENT)
Dept: SURGERY | Facility: CLINIC | Age: 75
End: 2025-05-20
Payer: MEDICARE

## 2025-05-20 VITALS
OXYGEN SATURATION: 96 % | HEART RATE: 68 BPM | HEIGHT: 63 IN | BODY MASS INDEX: 30.48 KG/M2 | WEIGHT: 172 LBS | DIASTOLIC BLOOD PRESSURE: 65 MMHG | SYSTOLIC BLOOD PRESSURE: 111 MMHG

## 2025-05-20 DIAGNOSIS — D05.11 DUCTAL CARCINOMA IN SITU (DCIS) OF RIGHT BREAST: Primary | ICD-10-CM

## 2025-05-20 PROCEDURE — 1126F AMNT PAIN NOTED NONE PRSNT: CPT | Performed by: SURGERY

## 2025-05-20 PROCEDURE — 3078F DIAST BP <80 MM HG: CPT | Performed by: SURGERY

## 2025-05-20 PROCEDURE — 99211 OFF/OP EST MAY X REQ PHY/QHP: CPT | Performed by: SURGERY

## 2025-05-20 PROCEDURE — 1159F MED LIST DOCD IN RCRD: CPT | Performed by: SURGERY

## 2025-05-20 PROCEDURE — 1036F TOBACCO NON-USER: CPT | Performed by: SURGERY

## 2025-05-20 PROCEDURE — 3074F SYST BP LT 130 MM HG: CPT | Performed by: SURGERY

## 2025-05-20 ASSESSMENT — PAIN SCALES - GENERAL: PAINLEVEL_OUTOF10: 0-NO PAIN

## 2025-05-20 NOTE — ASSESSMENT & PLAN NOTE
Patient status post Magseed localization lumpectomy for small DCIS.  It only measured 3 mm.  We have clean margins at least 6 mm.  Patient being referred to oncology for discussion of endocrine therapy.  If she chooses not to do endocrine therapy should even be a candidate for radiation therapy.  Patient to follow-up with me in 6 months for repeat breast check.

## 2025-05-21 ENCOUNTER — CLINICAL SUPPORT (OUTPATIENT)
Dept: PRIMARY CARE | Facility: CLINIC | Age: 75
End: 2025-05-21
Payer: MEDICARE

## 2025-05-21 ENCOUNTER — OFFICE VISIT (OUTPATIENT)
Dept: PRIMARY CARE | Facility: CLINIC | Age: 75
End: 2025-05-21
Payer: MEDICARE

## 2025-05-21 VITALS
HEIGHT: 63 IN | HEART RATE: 60 BPM | BODY MASS INDEX: 30.12 KG/M2 | DIASTOLIC BLOOD PRESSURE: 72 MMHG | OXYGEN SATURATION: 95 % | TEMPERATURE: 97.3 F | RESPIRATION RATE: 18 BRPM | WEIGHT: 170 LBS | SYSTOLIC BLOOD PRESSURE: 114 MMHG

## 2025-05-21 DIAGNOSIS — D05.11 DUCTAL CARCINOMA IN SITU (DCIS) OF RIGHT BREAST: ICD-10-CM

## 2025-05-21 DIAGNOSIS — E78.2 MIXED HYPERLIPIDEMIA: Primary | ICD-10-CM

## 2025-05-21 DIAGNOSIS — E11.65 TYPE 2 DIABETES MELLITUS WITH HYPERGLYCEMIA, WITHOUT LONG-TERM CURRENT USE OF INSULIN: ICD-10-CM

## 2025-05-21 DIAGNOSIS — E03.9 HYPOTHYROIDISM, UNSPECIFIED TYPE: ICD-10-CM

## 2025-05-21 DIAGNOSIS — G47.9 SLEEP DIFFICULTIES: ICD-10-CM

## 2025-05-21 DIAGNOSIS — I10 PRIMARY HYPERTENSION: ICD-10-CM

## 2025-05-21 DIAGNOSIS — F41.9 ANXIETY: ICD-10-CM

## 2025-05-21 DIAGNOSIS — E11.9 DIABETES MELLITUS WITHOUT COMPLICATION: ICD-10-CM

## 2025-05-21 LAB — POC HEMOGLOBIN A1C: 7.3 % (ref 4.2–6.5)

## 2025-05-21 PROCEDURE — 1126F AMNT PAIN NOTED NONE PRSNT: CPT | Performed by: NURSE PRACTITIONER

## 2025-05-21 PROCEDURE — 3078F DIAST BP <80 MM HG: CPT | Performed by: NURSE PRACTITIONER

## 2025-05-21 PROCEDURE — 1159F MED LIST DOCD IN RCRD: CPT | Performed by: NURSE PRACTITIONER

## 2025-05-21 PROCEDURE — 1036F TOBACCO NON-USER: CPT | Performed by: NURSE PRACTITIONER

## 2025-05-21 PROCEDURE — 99214 OFFICE O/P EST MOD 30 MIN: CPT | Performed by: NURSE PRACTITIONER

## 2025-05-21 PROCEDURE — 83036 HEMOGLOBIN GLYCOSYLATED A1C: CPT | Mod: QW | Performed by: NURSE PRACTITIONER

## 2025-05-21 PROCEDURE — 3074F SYST BP LT 130 MM HG: CPT | Performed by: NURSE PRACTITIONER

## 2025-05-21 ASSESSMENT — PAIN SCALES - GENERAL: PAINLEVEL_OUTOF10: 0-NO PAIN

## 2025-05-21 NOTE — PROGRESS NOTES
Jardiance Education:     - Counseled patient on Jardiance MOA, expectations, side effects, duration of therapy, administration, and monitoring parameters.  - Reviewed the benefits of SGLT-2i therapy, such as glycemic control and kidney and CV protection.  - Advised patient to practice proper  hygiene to reduce risk of UTIs or yeast infections.  - Advised patient to maintain adequate fluid intake to remain hydrated while on SGLT2i therapy.  - Answered all patient questions and concerns.    Medication education provided by DANIS Edmond, PharmD, BCPS

## 2025-05-21 NOTE — PROGRESS NOTES
"Subjective   Patient ID: Sheron Meyers is a 75 y.o. female who presents for Diabetes (PT IS HERE FOR A DIABETIC CHECK).    SINCE LAST VISIT  HAS LUMPECTOMY FOR R DUCTAL BREAST CANCER, SAW DR SHARMA MEETING WITH ONCOLOGY NEXT  WEEK, reviweed chart and notes      Diabetes  She presents for her follow-up diabetic visit. She has type 2 diabetes mellitus. Her disease course has been worsening. Symptoms are worsening. She is following a generally healthy diet. She has not had a previous visit with a dietitian. She participates in exercise intermittently. An ACE inhibitor/angiotensin II receptor blocker is being taken. She does not see a podiatrist.Eye exam is current.        Review of Systems   All other systems reviewed and are negative.      Objective   /72   Pulse 60   Temp 36.3 °C (97.3 °F)   Resp 18   Ht 1.6 m (5' 3\")   Wt 77.1 kg (170 lb)   LMP  (LMP Unknown)   SpO2 95%   BMI 30.11 kg/m²     Physical Exam  Constitutional:       General: She is not in acute distress.     Appearance: Normal appearance.   Cardiovascular:      Rate and Rhythm: Normal rate and regular rhythm.      Heart sounds: No murmur heard.  Pulmonary:      Breath sounds: Normal breath sounds. No wheezing.   Musculoskeletal:         General: Normal range of motion.      Cervical back: Neck supple.   Feet:      Right foot:      Skin integrity: Skin integrity normal.      Toenail Condition: Right toenails are normal.      Left foot:      Skin integrity: Skin integrity normal.      Toenail Condition: Left toenails are normal.      Comments: Pulses ok  Skin:     General: Skin is warm and dry.   Neurological:      Mental Status: She is alert and oriented to person, place, and time.   Psychiatric:         Behavior: Behavior normal.       Assessment/Plan   Problem List Items Addressed This Visit           ICD-10-CM    Anxiety F41.9    Diabetes mellitus without complication E11.9    Relevant Medications    empagliflozin (Jardiance) 10 mg " tablet    Other Relevant Orders    POCT glycosylated hemoglobin (Hb A1C) manually resulted (Completed)    Hyperglycemia due to type 2 diabetes mellitus (Multi) E11.65    Relevant Medications    empagliflozin (Jardiance) 10 mg tablet    Other Relevant Orders    POCT glycosylated hemoglobin (Hb A1C) manually resulted (Completed)    Albumin-Creatinine Ratio, Urine Random    Referral to Clinical Pharmacy    Hyperlipidemia - Primary E78.5    Hypertension I10    Hypothyroid E03.9    Ductal carcinoma in situ (DCIS) of breast D05.10     Other Visit Diagnoses         Codes      Sleep difficulties     G47.9        Return in 3 months . So happy margins were clear, call if  you need anything

## 2025-05-22 ENCOUNTER — PATIENT MESSAGE (OUTPATIENT)
Dept: SURGERY | Facility: CLINIC | Age: 75
End: 2025-05-22
Payer: MEDICARE

## 2025-05-22 LAB
ALBUMIN/CREAT UR: NORMAL MG/G CREAT
CREAT UR-MCNC: 118 MG/DL (ref 20–275)
MICROALBUMIN UR-MCNC: <0.2 MG/DL

## 2025-05-29 ENCOUNTER — OFFICE VISIT (OUTPATIENT)
Dept: HEMATOLOGY/ONCOLOGY | Facility: CLINIC | Age: 75
End: 2025-05-29
Payer: MEDICARE

## 2025-05-29 ENCOUNTER — PATIENT OUTREACH (OUTPATIENT)
Dept: HEMATOLOGY/ONCOLOGY | Facility: CLINIC | Age: 75
End: 2025-05-29

## 2025-05-29 VITALS
BODY MASS INDEX: 31.36 KG/M2 | RESPIRATION RATE: 17 BRPM | HEART RATE: 73 BPM | TEMPERATURE: 97 F | HEIGHT: 62 IN | OXYGEN SATURATION: 94 % | SYSTOLIC BLOOD PRESSURE: 104 MMHG | DIASTOLIC BLOOD PRESSURE: 60 MMHG | WEIGHT: 170.42 LBS

## 2025-05-29 DIAGNOSIS — D05.11 DUCTAL CARCINOMA IN SITU (DCIS) OF RIGHT BREAST: ICD-10-CM

## 2025-05-29 PROCEDURE — 3074F SYST BP LT 130 MM HG: CPT | Performed by: INTERNAL MEDICINE

## 2025-05-29 PROCEDURE — 1036F TOBACCO NON-USER: CPT | Performed by: INTERNAL MEDICINE

## 2025-05-29 PROCEDURE — 1126F AMNT PAIN NOTED NONE PRSNT: CPT | Performed by: INTERNAL MEDICINE

## 2025-05-29 PROCEDURE — 3078F DIAST BP <80 MM HG: CPT | Performed by: INTERNAL MEDICINE

## 2025-05-29 PROCEDURE — 99215 OFFICE O/P EST HI 40 MIN: CPT | Performed by: INTERNAL MEDICINE

## 2025-05-29 PROCEDURE — 1159F MED LIST DOCD IN RCRD: CPT | Performed by: INTERNAL MEDICINE

## 2025-05-29 PROCEDURE — 99205 OFFICE O/P NEW HI 60 MIN: CPT | Performed by: INTERNAL MEDICINE

## 2025-05-29 PROCEDURE — 1160F RVW MEDS BY RX/DR IN RCRD: CPT | Performed by: INTERNAL MEDICINE

## 2025-05-29 RX ORDER — LETROZOLE 2.5 MG/1
2.5 TABLET, FILM COATED ORAL DAILY
Qty: 60 TABLET | Refills: 5 | Status: SHIPPED | OUTPATIENT
Start: 2025-05-29 | End: 2026-05-29

## 2025-05-29 SDOH — HEALTH STABILITY: PHYSICAL HEALTH: ON AVERAGE, HOW MANY MINUTES DO YOU ENGAGE IN EXERCISE AT THIS LEVEL?: 0 MIN

## 2025-05-29 SDOH — ECONOMIC STABILITY: FOOD INSECURITY: WITHIN THE PAST 12 MONTHS, YOU WORRIED THAT YOUR FOOD WOULD RUN OUT BEFORE YOU GOT MONEY TO BUY MORE.: NEVER TRUE

## 2025-05-29 SDOH — ECONOMIC STABILITY: INCOME INSECURITY: IN THE LAST 12 MONTHS, WAS THERE A TIME WHEN YOU WERE NOT ABLE TO PAY THE MORTGAGE OR RENT ON TIME?: NO

## 2025-05-29 SDOH — ECONOMIC STABILITY: TRANSPORTATION INSECURITY
IN THE PAST 12 MONTHS, HAS THE LACK OF TRANSPORTATION KEPT YOU FROM MEDICAL APPOINTMENTS OR FROM GETTING MEDICATIONS?: NO

## 2025-05-29 SDOH — HEALTH STABILITY: PHYSICAL HEALTH: ON AVERAGE, HOW MANY DAYS PER WEEK DO YOU ENGAGE IN MODERATE TO STRENUOUS EXERCISE (LIKE A BRISK WALK)?: 0 DAYS

## 2025-05-29 SDOH — ECONOMIC STABILITY: GENERAL
WHICH OF THE FOLLOWING WOULD YOU LIKE TO GET CONNECTED TO IN ORDER TO RECEIVE A DISCOUNT OR FOR FREE? (CHOOSE ALL THAT APPLY): NO ASSISTANCE NEEDED

## 2025-05-29 SDOH — ECONOMIC STABILITY: FOOD INSECURITY: WITHIN THE PAST 12 MONTHS, THE FOOD YOU BOUGHT JUST DIDN'T LAST AND YOU DIDN'T HAVE MONEY TO GET MORE.: NEVER TRUE

## 2025-05-29 SDOH — ECONOMIC STABILITY: TRANSPORTATION INSECURITY
IN THE PAST 12 MONTHS, HAS LACK OF TRANSPORTATION KEPT YOU FROM MEETINGS, WORK, OR FROM GETTING THINGS NEEDED FOR DAILY LIVING?: NO

## 2025-05-29 SDOH — ECONOMIC STABILITY: GENERAL
WHICH OF THE FOLLOWING DO YOU KNOW HOW TO USE AND HAVE ACCESS TO EVERY DAY? (CHOOSE ALL THAT APPLY): DESKTOP COMPUTER, LAPTOP COMPUTER, OR TABLET WITH BROADBAND INTERNET CONNECTION

## 2025-05-29 ASSESSMENT — SOCIAL DETERMINANTS OF HEALTH (SDOH)
HOW OFTEN DO YOU ATTEND CHURCH OR RELIGIOUS SERVICES?: PATIENT DECLINED
DO YOU BELONG TO ANY CLUBS OR ORGANIZATIONS SUCH AS CHURCH GROUPS UNIONS, FRATERNAL OR ATHLETIC GROUPS, OR SCHOOL GROUPS?: PATIENT DECLINED
WITHIN THE LAST YEAR, HAVE YOU BEEN HUMILIATED OR EMOTIONALLY ABUSED IN OTHER WAYS BY YOUR PARTNER OR EX-PARTNER?: NO
HOW OFTEN DO YOU GET TOGETHER WITH FRIENDS OR RELATIVES?: MORE THAN THREE TIMES A WEEK
WITHIN THE LAST YEAR, HAVE YOU BEEN AFRAID OF YOUR PARTNER OR EX-PARTNER?: NO
WITHIN THE LAST YEAR, HAVE TO BEEN RAPED OR FORCED TO HAVE ANY KIND OF SEXUAL ACTIVITY BY YOUR PARTNER OR EX-PARTNER?: NO
WITHIN THE LAST YEAR, HAVE YOU BEEN KICKED, HIT, SLAPPED, OR OTHERWISE PHYSICALLY HURT BY YOUR PARTNER OR EX-PARTNER?: NO
IN A TYPICAL WEEK, HOW MANY TIMES DO YOU TALK ON THE PHONE WITH FAMILY, FRIENDS, OR NEIGHBORS?: MORE THAN THREE TIMES A WEEK
IN THE PAST 12 MONTHS, HAS THE ELECTRIC, GAS, OIL, OR WATER COMPANY THREATENED TO SHUT OFF SERVICE IN YOUR HOME?: NO
HOW HARD IS IT FOR YOU TO PAY FOR THE VERY BASICS LIKE FOOD, HOUSING, MEDICAL CARE, AND HEATING?: NOT HARD AT ALL

## 2025-05-29 ASSESSMENT — LIFESTYLE VARIABLES
HOW OFTEN DO YOU HAVE SIX OR MORE DRINKS ON ONE OCCASION: NEVER
HOW MANY STANDARD DRINKS CONTAINING ALCOHOL DO YOU HAVE ON A TYPICAL DAY: 1 OR 2
AUDIT-C TOTAL SCORE: 1
SKIP TO QUESTIONS 9-10: 1
HOW OFTEN DO YOU HAVE A DRINK CONTAINING ALCOHOL: MONTHLY OR LESS

## 2025-05-29 ASSESSMENT — PAIN SCALES - GENERAL: PAINLEVEL_OUTOF10: 0-NO PAIN

## 2025-05-29 ASSESSMENT — PATIENT HEALTH QUESTIONNAIRE - PHQ9
2. FEELING DOWN, DEPRESSED OR HOPELESS: NOT AT ALL
SUM OF ALL RESPONSES TO PHQ9 QUESTIONS 1 & 2: 0
1. LITTLE INTEREST OR PLEASURE IN DOING THINGS: NOT AT ALL

## 2025-05-29 NOTE — PROGRESS NOTES
Pt seen in office today for a new patient  visit with Dr. Stu Santiago for management of her breast cancer.She has been referred to our office by Dr. Vicky Mascorro.  She is  without complaints today and denies pain.     Medications, pharmacy preference and allergies were reviewed with patient and updated in the medical record by MD.     Per orders, she is to start on letrozole and she is to  be referred to Radiation Oncology for breast radiation and she is to RTC in 3 months with labs prior to her visit. [facility].    Our contact information was given to patient and they were encouraged to contact us with any questions or concerns.    Patient verbalized understanding and agreement regarding discussed information via verbal feedback. Pt escorted to scheduling.

## 2025-05-29 NOTE — PROGRESS NOTES
Patient ID: Sheron Meyers is a 75 y.o. female.  Referring Physician: Vicky Mascorro MD  [address] [facility] Physician Jonathan Barahona [address] [place], [place] [id number]  Primary Care Provider: RAFAEL Gee-CNP  Referral Reason: breast cancer    Subjective:  No complaints    Heme/Onc History:  s/p right breast lumpectomy with magseed localization done on 05/05/2025.     Findings: Radiographic confirmation of excision of the affected area   Surgical pathology was collected and finalized.          FINAL DIAGNOSIS      A. RIGHT BREAST, LUMPECTOMY:              Residual ductal carcinoma in situ, solid type, intermediate to high nuclear grade, with necrosis, see comment.              Atypical intraductal hyperplasia.              Sclerosing adenosis, duct ectasia, apocrine change and intraductal hyperplasia, usual type.              Findings related to previous biopsy site.     Comment: A focus of residual DCIS is present at the previous biopsy site and measures 3 mm. The margins of excision are free of DCIS. Invasive carcinoma is not seen.     B. SKIN OF RIGHT BREAST, EXCISION:              Scar, negative for malignancy.     Consultation with concurrence: Marika Galeana M.D.   Electronically signed by Parminder Bolanos MD on 5/15/2025 at 1143 T       [facility]   By the signature on this report, the individual or group listed as making the Final Interpretation/Diagnosis certifies that they have reviewed this case.    Case Summary Report   DCIS OF THE BREAST: Resection   8th Edition - Protocol posted: 3/23/2022DCIS OF THE BREAST: RESECTION - A         SPECIMEN   Procedure   Excision (less than total mastectomy)   Specimen Laterality   Right   TUMOR   Tumor Site   Not specified   Histologic Type   Ductal carcinoma in situ   Size (Extent) of DCIS   Estimated size (extent) of DCIS is at least (Millimeters): 3 mm   Number of Blocks with DCIS   1   Number of Blocks Examined   17   Architectural Patterns   Solid   Nuclear  "Grade   Grade II (intermediate)   Necrosis   Present, central (expansive \"comedo\" necrosis)   Microcalcifications   Present in nonneoplastic tissue   MARGINS   Margin Status   All margins negative for DCIS   Distance from DCIS to Closest Margin   6 mm   Closest Margin(s) to DCIS   Anterior   Distance from DCIS to Anterior Margin   6 mm   Distance from DCIS to Posterior Margin   15 mm   Distance from DCIS to Superior Margin   Greater than: 2 mm   Distance from DCIS to Inferior Margin   Greater than: 2 mm   Distance from DCIS to Medial Margin   11 mm   Distance from DCIS to Lateral Margin   Greater than: 2 mm   REGIONAL LYMPH NODES   Regional Lymph Node Status   Not applicable (no regional lymph nodes submitted or found)   PATHOLOGIC STAGE CLASSIFICATION (pTNM, AJCC 8th Edition)   Reporting of pT, pN, and (when applicable) pM categories is based on information available to the pathologist at the time the report is issued. As per the AJCC (Chapter 1, 8th Ed.) it is the managing physician’s responsibility to establish the final pathologic stage based upon all pertinent information, including but potentially not limited to this pathology report.   pT Category   pTis (DCIS)   pN Category   pN not assigned (no nodes submitted or found)   ADDITIONAL FINDINGS   Additional Findings   Atypical intraductal hyperplasia, duct ectasia, apocrine change and usual intraductal hyperplasia; findings related to previous biopsy site   Breast Biomarker Testing Performed on Previous Biopsy        Estrogen Receptor (ER) Status   Positive    Percentage of Cells with Nuclear Positivity   >95 %    Testing Performed on Case Number   [id number]               Past Medical History: Medical History[1]  Social History:   Social History     Socioeconomic History    Marital status:      Spouse name: Not on file    Number of children: Not on file    Years of education: Not on file    Highest education level: Not on file   Occupational History    " "Not on file   Tobacco Use    Smoking status: Never     Passive exposure: Never    Smokeless tobacco: Never   Vaping Use    Vaping status: Never Used   Substance and Sexual Activity    Alcohol use: Not Currently     Comment: occ    Drug use: Never    Sexual activity: Defer   Other Topics Concern    Not on file   Social History Narrative    Not on file     Social Drivers of Health     Financial Resource Strain: Not on file   Food Insecurity: Not on file   Transportation Needs: Not on file   Physical Activity: Not on file   Stress: Not on file   Social Connections: Not on file   Intimate Partner Violence: Not on file   Housing Stability: Not on file     Surgical History: Surgical History[2]  Family History: Family History[3]   reports that she has never smoked. She has never been exposed to tobacco smoke. She has never used smokeless tobacco.  Oncology Family history: Cancer-related family history includes Cancer in her father.    Review Of Systems:  As stated per in HPI; otherwise all other 12 point ROS are negative    Physical Exam:  /60 (BP Location: Left arm, Patient Position: Sitting, BP Cuff Size: Adult)   Pulse 73   Temp 36.1 °C (97 °F) (Temporal)   Resp 17   Ht (S) 1.584 m (5' 2.36\")   Wt 77.3 kg (170 lb 6.7 oz)   LMP  (LMP Unknown)   SpO2 94%   BMI 30.81 kg/m²   BSA: 1.84 meters squared  General: awake/alert/oriented x3, no distress, alert and cooperative  Head: Short hair fully covering scalp. Symmetric facial expressions  Eyes: PERRL, EOMI, clear sclera, eyebrows present.  Ears/Nose/Mouth/Throat:  Oral mucous membranes moist. No oral ulcers. No palpable pre/post-auricular lymph nodes  Neck: No palpable cervical chain lymph nodes  Respiratory: unlabored breathing on room air, good chest expansion, thorax symmetric  Cardio: Regular rate and rhythm, normal S1 and S2, radial pulses symmetric  GI: Nondistended, soft, non-tender abdomen  Musculoskeletal: Normal muscle bulk and tone, ROM intact, no " joint swelling.  Rises from chair and walks unassisted.  Extremities: No ankle swelling, no arm or leg wounds  Neuro: Alert, cognition intact, speech normal. Facial expressions symmetric.  No motor deficits noted. Sensation intact to touch and hot/cold.   Able to stand from seated position unassisted and walks around the room unassisted.  Psychological: Appropriate mood and behavior.  Skin: Warm and dry, no lesions, no rashes    Results:  Diagnostic Results   Lab Results   Component Value Date    WBC 6.6 04/21/2025    HGB 12.3 04/21/2025    HCT 38.9 04/21/2025    MCV 94 04/21/2025     04/21/2025     Lab Results   Component Value Date    CALCIUM 9.1 04/21/2025     04/21/2025    K 3.9 04/21/2025    CO2 32 04/21/2025    CL 99 04/21/2025    BUN 19 04/21/2025    CREATININE 0.94 04/21/2025    ALT 15 11/19/2024    AST 16 11/19/2024     Current Medications[4]     Assessment/Plan:  ? DCIS:    75-year-old female with a DCIS right breast measuring 3 mm s/p lumpectomy with negative margins, interm grade, ER+.    Adjuvant AI is discussed. Pros and cons are discussed, lack of survival benefit and small size of DCIS, I did not have strong rec about AI but she wanted to start taking it    RTC in 3 months for toxicity check    DEXA scan osteopenia left hip. Repeat in 11.2026.    Will refer to [employer] [employer] for discussion of adj RT.    Diagnoses and all orders for this visit:  Ductal carcinoma in situ (DCIS) of right breast  -     Referral To Hematology and Oncology       Performance Status: Asymptomatic    I spent more than 60 minutes for the patient today, including face-to-face conversation, pre-visit preparation, post-visit orders, and others.   Stu Santiago MD                                [1]   Past Medical History:  Diagnosis Date    Allergic     Arthritis 2024    Depression 1995    Diabetes mellitus (Multi) 2012    Eczema 2020    HL (hearing loss) 2018”    Hypertension 2023’    Personal history of other diseases of  the circulatory system 08/20/2021    History of atrial fibrillation    Personal history of other diseases of the circulatory system 08/20/2021    History of hypertension    Personal history of other endocrine, nutritional and metabolic disease 08/20/2021    History of type 2 diabetes mellitus    Personal history of other mental and behavioral disorders 08/20/2021    History of depression    Urinary tract infection    [2]   Past Surgical History:  Procedure Laterality Date    BREAST BIOPSY Right 02/25/2025    BREAST LUMPECTOMY W/ NEEDLE LOCALIZATION Right 05/05/2025    HYSTERECTOMY  1998    OTHER STRABISMUS SURGERY Bilateral 11/2023    cataracts    OTHER SURGICAL HISTORY  08/20/2021    Hemithyroidectomy    OTHER SURGICAL HISTORY  08/20/2021    Hysterectomy    TONSILLECTOMY  1958    WISDOM TOOTH EXTRACTION  1968   [3]   Family History  Problem Relation Name Age of Onset    Other (hay fever) Mother Darlene Clinton     Arthritis Mother Darlene Clinton     Mental illness Mother Darlene Clinton     Depression Mother Darlene Clinton     Hearing loss Mother Darlene Clinton     Arthritis Father Channing Clinton     Cancer Father Channing Clinton     Diabetes Father Channing Clinton     Heart disease Father Channing Clinton     Hearing loss Father Channing Clinton     Diabetes Brother Ruy Clinton     Atrial fibrillation Brother Ruy Clinton     Hearing loss Brother Ruy Clinton     Other (hay fever) Son      Diabetes Other siblings     Heart disease Other siblings     Alcohol abuse Brother Maximilian Clinton     Diabetes Brother Maximilian Clinton     Hearing loss Brother Maximilian Clinton     Hypertension Brother Maximilian Clinton    [4]   Current Outpatient Medications:     Accu-Chek Zayra Plus test strp strip, USE 2 TIMES A DAY, Disp: 100 each, Rfl: 2    atorvastatin (Lipitor) 20 mg tablet, Take 1 tablet (20 mg) by mouth once daily., Disp: 100 tablet, Rfl: 3    blood sugar diagnostic (ACCU-CHEK ZAYRA MISC), as directed external as  directed for 1 year, Disp: , Rfl:     buPROPion XL (Wellbutrin XL) 150 mg 24 hr tablet, Take 1 tablet (150 mg) by mouth once daily in the morning., Disp: , Rfl:     diclofenac sodium (Voltaren) 1 % gel, Apply 4.5 inches (4 g) topically 4 times a day., Disp: 100 g, Rfl: 1    empagliflozin (Jardiance) 10 mg tablet, Take 1 tablet (10 mg) by mouth once daily., Disp: 30 tablet, Rfl: 2    fluticasone (Flonase) 50 mcg/actuation nasal spray, Administer 1 spray into each nostril once daily., Disp: 16 g, Rfl: 11    hydroCHLOROthiazide (HYDRODiuril) 25 mg tablet, TAKE ONE TABLET BY MOUTH DAILY, Disp: 90 tablet, Rfl: 3    ketoconazole (NIZOral) 2 % cream, Apply topically once daily as needed., Disp: , Rfl:     ketoconazole (NIZOral) 2 % shampoo, APPLY TOPICALLY TO SCALP 2 TO 3 TIMES A WEEK., Disp: , Rfl:     lancets misc, Inject under the skin 2 times a day. As directed, Disp: , Rfl:     levothyroxine (Synthroid, Levoxyl) 75 mcg tablet, Take 1 tablet (75 mcg) by mouth once daily., Disp: 90 tablet, Rfl: 2    losartan (Cozaar) 100 mg tablet, Take 1 tablet (100 mg) by mouth once daily., Disp: 90 tablet, Rfl: 3    sertraline (Zoloft) 100 mg tablet, Take 1 tablet (100 mg) by mouth once daily., Disp: , Rfl:     SITagliptin phosphate (Januvia) 100 mg tablet, Take 1 tablet (100 mg) by mouth once daily., Disp: 90 tablet, Rfl: 3    sotalol (Betapace) 80 mg tablet, TAKE 1 TABLET BY MOUTH TWICE DAILY, Disp: 180 tablet, Rfl: 3    traZODone (Desyrel) 100 mg tablet, Take 1 tablet (100 mg) by mouth once daily., Disp: 90 tablet, Rfl: 2    vibegron (Gemtesa) 75 mg tablet, Take 1 tablet (75 mg) by mouth early in the morning.., Disp: 90 tablet, Rfl: 1    warfarin (Coumadin) 2.5 mg tablet, Take 1 tablet (2.5 mg) by mouth see administration instructions. 1 tablet oral Mondays and Thursdays only, Disp: 24 tablet, Rfl: 3    warfarin (Coumadin) 5 mg tablet, Take 1 tablet (5 mg) by mouth see administration instructions., Disp: 90 tablet, Rfl: 3

## 2025-05-31 ENCOUNTER — APPOINTMENT (OUTPATIENT)
Dept: RADIOLOGY | Facility: HOSPITAL | Age: 75
End: 2025-05-31
Payer: MEDICARE

## 2025-05-31 ENCOUNTER — HOSPITAL ENCOUNTER (EMERGENCY)
Facility: HOSPITAL | Age: 75
Discharge: HOME | End: 2025-05-31
Attending: EMERGENCY MEDICINE
Payer: MEDICARE

## 2025-05-31 VITALS
RESPIRATION RATE: 18 BRPM | TEMPERATURE: 97.3 F | HEART RATE: 62 BPM | DIASTOLIC BLOOD PRESSURE: 74 MMHG | HEIGHT: 64 IN | SYSTOLIC BLOOD PRESSURE: 142 MMHG | BODY MASS INDEX: 28.85 KG/M2 | OXYGEN SATURATION: 94 % | WEIGHT: 169 LBS

## 2025-05-31 DIAGNOSIS — Z23 TETANUS TOXOID VACCINATION ADMINISTERED AT CURRENT VISIT: ICD-10-CM

## 2025-05-31 DIAGNOSIS — S22.080A CLOSED WEDGE COMPRESSION FRACTURE OF T11 VERTEBRA, INITIAL ENCOUNTER (MULTI): Primary | ICD-10-CM
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
## 2025-05-31 LAB
ALBUMIN SERPL BCP-MCNC: 4 G/DL (ref 3.4–5)
ALP SERPL-CCNC: 91 U/L (ref 33–136)
ALT SERPL W P-5'-P-CCNC: 15 U/L (ref 7–45)
ANION GAP SERPL CALC-SCNC: 12 MMOL/L (ref 10–20)
APTT PPP: 42 SECONDS (ref 26–36)
AST SERPL W P-5'-P-CCNC: 16 U/L (ref 9–39)
BASOPHILS # BLD AUTO: 0.03 X10*3/UL (ref 0–0.1)
BASOPHILS NFR BLD AUTO: 0.3 %
BILIRUB SERPL-MCNC: 0.4 MG/DL (ref 0–1.2)
BUN SERPL-MCNC: 28 MG/DL (ref 6–23)
CALCIUM SERPL-MCNC: 9 MG/DL (ref 8.6–10.3)
CHLORIDE SERPL-SCNC: 100 MMOL/L (ref 98–107)
CO2 SERPL-SCNC: 29 MMOL/L (ref 21–32)
CREAT SERPL-MCNC: 1.02 MG/DL (ref 0.5–1.05)
EGFRCR SERPLBLD CKD-EPI 2021: 57 ML/MIN/1.73M*2
EOSINOPHIL # BLD AUTO: 0.04 X10*3/UL (ref 0–0.4)
EOSINOPHIL NFR BLD AUTO: 0.4 %
ERYTHROCYTE [DISTWIDTH] IN BLOOD BY AUTOMATED COUNT: 14.1 % (ref 11.5–14.5)
GLUCOSE SERPL-MCNC: 178 MG/DL (ref 74–99)
HCT VFR BLD AUTO: 38.5 % (ref 36–46)
HGB BLD-MCNC: 12.5 G/DL (ref 12–16)
IMM GRANULOCYTES # BLD AUTO: 0.04 X10*3/UL (ref 0–0.5)
IMM GRANULOCYTES NFR BLD AUTO: 0.4 % (ref 0–0.9)
INR PPP: 2 (ref 0.9–1.1)
LYMPHOCYTES # BLD AUTO: 1.37 X10*3/UL (ref 0.8–3)
LYMPHOCYTES NFR BLD AUTO: 15.4 %
MCH RBC QN AUTO: 30.1 PG (ref 26–34)
MCHC RBC AUTO-ENTMCNC: 32.5 G/DL (ref 32–36)
MCV RBC AUTO: 93 FL (ref 80–100)
MONOCYTES # BLD AUTO: 0.49 X10*3/UL (ref 0.05–0.8)
MONOCYTES NFR BLD AUTO: 5.5 %
NEUTROPHILS # BLD AUTO: 6.93 X10*3/UL (ref 1.6–5.5)
NEUTROPHILS NFR BLD AUTO: 78 %
NRBC BLD-RTO: 0 /100 WBCS (ref 0–0)
PLATELET # BLD AUTO: 269 X10*3/UL (ref 150–450)
POTASSIUM SERPL-SCNC: 3.5 MMOL/L (ref 3.5–5.3)
PROT SERPL-MCNC: 6.7 G/DL (ref 6.4–8.2)
PROTHROMBIN TIME: 22.3 SECONDS (ref 9.8–12.4)
RBC # BLD AUTO: 4.15 X10*6/UL (ref 4–5.2)
SODIUM SERPL-SCNC: 137 MMOL/L (ref 136–145)
WBC # BLD AUTO: 8.9 X10*3/UL (ref 4.4–11.3)

## 2025-05-31 PROCEDURE — 72125 CT NECK SPINE W/O DYE: CPT | Performed by: RADIOLOGY

## 2025-05-31 PROCEDURE — 36415 COLL VENOUS BLD VENIPUNCTURE: CPT

## 2025-05-31 PROCEDURE — 2500000005 HC RX 250 GENERAL PHARMACY W/O HCPCS

## 2025-05-31 PROCEDURE — 70450 CT HEAD/BRAIN W/O DYE: CPT | Performed by: RADIOLOGY

## 2025-05-31 PROCEDURE — 85025 COMPLETE CBC W/AUTO DIFF WBC: CPT

## 2025-05-31 PROCEDURE — 72131 CT LUMBAR SPINE W/O DYE: CPT | Performed by: RADIOLOGY

## 2025-05-31 PROCEDURE — 2500000001 HC RX 250 WO HCPCS SELF ADMINISTERED DRUGS (ALT 637 FOR MEDICARE OP)

## 2025-05-31 PROCEDURE — 90715 TDAP VACCINE 7 YRS/> IM: CPT | Mod: JZ

## 2025-05-31 PROCEDURE — 2500000004 HC RX 250 GENERAL PHARMACY W/ HCPCS (ALT 636 FOR OP/ED): Mod: JZ

## 2025-05-31 PROCEDURE — 80053 COMPREHEN METABOLIC PANEL: CPT

## 2025-05-31 PROCEDURE — 70450 CT HEAD/BRAIN W/O DYE: CPT

## 2025-05-31 PROCEDURE — 72125 CT NECK SPINE W/O DYE: CPT

## 2025-05-31 PROCEDURE — 99285 EMERGENCY DEPT VISIT HI MDM: CPT | Mod: 25 | Performed by: EMERGENCY MEDICINE

## 2025-05-31 PROCEDURE — 85730 THROMBOPLASTIN TIME PARTIAL: CPT

## 2025-05-31 PROCEDURE — 90471 IMMUNIZATION ADMIN: CPT

## 2025-05-31 PROCEDURE — 74177 CT ABD & PELVIS W/CONTRAST: CPT | Performed by: RADIOLOGY

## 2025-05-31 PROCEDURE — 74177 CT ABD & PELVIS W/CONTRAST: CPT

## 2025-05-31 PROCEDURE — 2550000001 HC RX 255 CONTRASTS

## 2025-05-31 PROCEDURE — 72128 CT CHEST SPINE W/O DYE: CPT | Performed by: RADIOLOGY

## 2025-05-31 PROCEDURE — 71260 CT THORAX DX C+: CPT | Performed by: RADIOLOGY

## 2025-05-31 RX ORDER — LIDOCAINE 560 MG/1
1 PATCH PERCUTANEOUS; TOPICAL; TRANSDERMAL ONCE
Status: DISCONTINUED | OUTPATIENT
Start: 2025-05-31 | End: 2025-05-31 | Stop reason: HOSPADM

## 2025-05-31 RX ORDER — LIDOCAINE 50 MG/G
1 PATCH TOPICAL DAILY
Qty: 7 PATCH | Refills: 0 | Status: SHIPPED | OUTPATIENT
Start: 2025-05-31

## 2025-05-31 RX ORDER — ACETAMINOPHEN 500 MG
1000 TABLET ORAL EVERY 6 HOURS PRN
Qty: 30 TABLET | Refills: 0 | Status: SHIPPED | OUTPATIENT
Start: 2025-05-31 | End: 2025-06-10

## 2025-05-31 RX ORDER — ACETAMINOPHEN 325 MG/1
975 TABLET ORAL ONCE
Status: COMPLETED | OUTPATIENT
Start: 2025-05-31 | End: 2025-05-31

## 2025-05-31 RX ADMIN — LIDOCAINE 4% 1 PATCH: 40 PATCH TOPICAL at 15:06

## 2025-05-31 RX ADMIN — ACETAMINOPHEN 975 MG: 325 TABLET, FILM COATED ORAL at 13:48

## 2025-05-31 RX ADMIN — TETANUS TOXOID, REDUCED DIPHTHERIA TOXOID AND ACELLULAR PERTUSSIS VACCINE, ADSORBED 0.5 ML: 5; 2.5; 8; 8; 2.5 SUSPENSION INTRAMUSCULAR at 13:49

## 2025-05-31 RX ADMIN — IOHEXOL 75 ML: 350 INJECTION, SOLUTION INTRAVENOUS at 13:45

## 2025-05-31 ASSESSMENT — PAIN DESCRIPTION - LOCATION: LOCATION: BACK

## 2025-05-31 ASSESSMENT — LIFESTYLE VARIABLES
HAVE YOU EVER FELT YOU SHOULD CUT DOWN ON YOUR DRINKING: NO
EVER FELT BAD OR GUILTY ABOUT YOUR DRINKING: NO
TOTAL SCORE: 0
HAVE PEOPLE ANNOYED YOU BY CRITICIZING YOUR DRINKING: NO
EVER HAD A DRINK FIRST THING IN THE MORNING TO STEADY YOUR NERVES TO GET RID OF A HANGOVER: NO

## 2025-05-31 ASSESSMENT — COLUMBIA-SUICIDE SEVERITY RATING SCALE - C-SSRS
2. HAVE YOU ACTUALLY HAD ANY THOUGHTS OF KILLING YOURSELF?: NO
1. IN THE PAST MONTH, HAVE YOU WISHED YOU WERE DEAD OR WISHED YOU COULD GO TO SLEEP AND NOT WAKE UP?: NO
6. HAVE YOU EVER DONE ANYTHING, STARTED TO DO ANYTHING, OR PREPARED TO DO ANYTHING TO END YOUR LIFE?: NO

## 2025-05-31 ASSESSMENT — PAIN DESCRIPTION - PAIN TYPE: TYPE: ACUTE PAIN

## 2025-05-31 ASSESSMENT — PAIN DESCRIPTION - DIRECTION: RADIATING_TOWARDS: ARM

## 2025-05-31 ASSESSMENT — PAIN SCALES - GENERAL
PAINLEVEL_OUTOF10: 2
PAINLEVEL_OUTOF10: 0 - NO PAIN
PAINLEVEL_OUTOF10: 5 - MODERATE PAIN

## 2025-05-31 ASSESSMENT — PAIN DESCRIPTION - ORIENTATION: ORIENTATION: RIGHT

## 2025-05-31 ASSESSMENT — PAIN - FUNCTIONAL ASSESSMENT: PAIN_FUNCTIONAL_ASSESSMENT: 0-10

## 2025-05-31 NOTE — DISCHARGE INSTRUCTIONS
Please follow-up with orthopedic-spine if pain persists.  We recommend acetaminophen 975 mg every 6 hours and lidocaine patches as needed for pain management in the meantime.    --   [facility]   [facility]  Phone: [phone]

## 2025-05-31 NOTE — ED PROVIDER NOTES
HPI   Chief Complaint   Patient presents with    Fall     Pt was reaching into a bag, when she lost her balance and fell into the wall with the Rt side of her body yesterday at 4 pm. Pt c/o rt upper arm pain and lower back pain.       Patient is a 75-year-old female with history of ductal carcinoma of the right breast (s/p lumpectomy and Magseed), HLD, HTN, hypothyroidism, mitral valve disorder, depression/anxiety, atrial fibrillation (on warfarin, last INR 2.4, missed dose last night) T2DM presenting with concern for back pain after fall.  Reports that she lost her balance, fell into wall and drywall gave out on her causing her to fall to the ground.  Unsure exactly what she had but reports pain in her lower back diffusely including midline and bilaterally.  Think she did not hit her head and denies loss of consciousness.  Endorses no symptoms preceding the fall denies syncope, lightheadedness, chest pain, shortness of breath.            Patient History   Medical History[1]  Surgical History[2]  Family History[3]  Social History[4]    Physical Exam   ED Triage Vitals [05/31/25 1207]   Temperature Heart Rate Respirations BP   36.3 °C (97.3 °F) 72 18 135/68      Pulse Ox Temp Source Heart Rate Source Patient Position   97 % Temporal -- --      BP Location FiO2 (%)     Left arm --       Physical Exam  Constitutional:       Appearance: Normal appearance.   HENT:      Head: Normocephalic and atraumatic.   Eyes:      Extraocular Movements: Extraocular movements intact.   Cardiovascular:      Rate and Rhythm: Normal rate.   Pulmonary:      Effort: Pulmonary effort is normal.   Abdominal:      Comments: Soft, nondistended, no tenderness to palpation to anterior abdomen.   Musculoskeletal:         General: Normal range of motion.      Cervical back: Normal range of motion.      Comments: Diffuse lower back pain.   Skin:     General: Skin is warm and dry.   Neurological:      General: No focal deficit present.      Mental  Status: She is alert and oriented to person, place, and time.      Comments: GCS 15.  Sensation intact in all dermatomes regions of lower extremities.  Age-appropriate 5/5 hip flexion, knee flexion/extension, plantarflexion/dorsiflexion.   Psychiatric:         Mood and Affect: Mood normal.         Behavior: Behavior normal.           ED Course & MDM   Diagnoses as of 05/31/25 1505   Fall, initial encounter   Tetanus toxoid vaccination administered at current visit   Closed wedge compression fracture of T11 vertebra, initial encounter (Multi)                 No data recorded                                 Medical Decision Making  Patient is a 75-year-old female with history of ductal carcinoma of the right breast (s/p lumpectomy and Magseed), HLD, HTN, hypothyroidism, mitral valve disorder, depression/anxiety, atrial fibrillation (on warfarin, last INR 2.4, missed dose last night) T2DM presenting with concern for back pain after fall.  Trauma survey most notable for tenderness in the midline and diffusely in the lower back but given fall, blood thinner use, trauma pan scan obtained.  Imaging most notable for closed wedge compression fracture of T11 vertebrae.  Discussed with on-call spine physician, Dr. Vazquez, who agreed with ED impression that likely requires outpatient follow-up as needed but no acute intervention.  Patient only in mild pain and treated with acetaminophen here.  Reported 1/10 pain afterwards and treated with lidocaine patch in case symptoms worsen at home.  Advised on home pain management with acetaminophen, lidocaine.  Will avoid NSAIDs given warfarin use.  Return precautions, expected clinical course discussed with patient and patient discharged home.    Patient seen and discussed with Dr. Alexsandra Beaver MD, PhD  Emergency Medicine PGY3          Procedure  Procedures       [1]   Past Medical History:  Diagnosis Date    Allergic     Arthritis 2024    Depression 1995    Diabetes mellitus  (Multi) 2012    Eczema 2020    HL (hearing loss) 2018”    Hypertension 2023’    Personal history of other diseases of the circulatory system 08/20/2021    History of atrial fibrillation    Personal history of other diseases of the circulatory system 08/20/2021    History of hypertension    Personal history of other endocrine, nutritional and metabolic disease 08/20/2021    History of type 2 diabetes mellitus    Personal history of other mental and behavioral disorders 08/20/2021    History of depression    Urinary tract infection    [2]   Past Surgical History:  Procedure Laterality Date    BREAST BIOPSY Right 02/25/2025    BREAST LUMPECTOMY W/ NEEDLE LOCALIZATION Right 05/05/2025    HYSTERECTOMY  1998    OTHER STRABISMUS SURGERY Bilateral 11/2023    cataracts    OTHER SURGICAL HISTORY  08/20/2021    Hemithyroidectomy    OTHER SURGICAL HISTORY  08/20/2021    Hysterectomy    TONSILLECTOMY  1958    WISDOM TOOTH EXTRACTION  1968   [3]   Family History  Problem Relation Name Age of Onset    Other (hay fever) Mother Darlene Clinton     Arthritis Mother Darlene Clinton     Mental illness Mother Darlene Clinton     Depression Mother Darlene Clinton     Hearing loss Mother Darlene Clinton     Arthritis Father Channing Clinton     Cancer Father Channing Clinton     Diabetes Father Channing Clinton     Heart disease Father Channing Clinton     Hearing loss Father Channing Clinton     Diabetes Brother Ruy Clinton     Atrial fibrillation Brother Ruy Clinton     Hearing loss Brother Ruy Clinton     Other (hay fever) Son      Diabetes Other siblings     Heart disease Other siblings     Alcohol abuse Brother Maximilian Clinton     Diabetes Brother Maximilian Clinton     Hearing loss Brother Maximilian Clinton     Hypertension Brother Maximilianpriscilla Levineford    [4]   Social History  Tobacco Use    Smoking status: Never     Passive exposure: Never    Smokeless tobacco: Never   Vaping Use    Vaping status: Never Used   Substance Use Topics    Alcohol  use: Not Currently     Comment: occ    Drug use: Never        Benoit Beaver MD  Resident  05/31/25 9569

## 2025-05-31 NOTE — ED TRIAGE NOTES
Pt was reaching into a bag, when she lost her balance and fell into the wall with the Rt side of her body yesterday at 4 pm. Pt c/o rt upper arm pain and lower back pain.

## 2025-05-31 NOTE — ED PROVIDER NOTES
The patient was seen by the midlevel/resident.  I have personally saw the patient and made/approved the management plan and take responsibility for the patient management.  I reviewed the EKG's (when done) and agree with the interpretation.  I have seen and examined the patient; agree with the workup, evaluation, MDM, and diagnosis.  The care plan has been discussed with the midlevel/resident; I have reviewed the note and agree with the documented findings.     Patient is on Coumadin for A-fib and fell into the drywall last night.  She denies hitting her head.  She bumped her right elbow and scraped it and has she also damaged the drywall.  This happened mechanically when she stepped down pain in her back.  Did not see it into her sunken room.  She will be worked up with CT scans and labs.  She will be updated on her tetanus.   Patient is stable and does not require hospitalization we ran her fracture by the spine physician and patient will be discharged home.  Diagnoses as of 05/31/25 1722   Fall, initial encounter   Tetanus toxoid vaccination administered at current visit   Closed wedge compression fracture of T11 vertebra, initial encounter (Multi)     MD Bola Cash MD  05/31/25 1722

## 2025-06-10 ENCOUNTER — TELEPHONE (OUTPATIENT)
Dept: CARDIOLOGY | Facility: CLINIC | Age: 75
End: 2025-06-10
Payer: MEDICARE

## 2025-06-10 LAB
POC INR: 3 (ref 0.9–1.1)
POC PROTHROMBIN TIME: ABNORMAL (ref 9.3–12.5)

## 2025-06-12 ENCOUNTER — APPOINTMENT (OUTPATIENT)
Dept: UROLOGY | Facility: CLINIC | Age: 75
End: 2025-06-12
Payer: MEDICARE

## 2025-06-12 ENCOUNTER — ANTICOAGULATION - WARFARIN VISIT (OUTPATIENT)
Facility: CLINIC | Age: 75
End: 2025-06-12

## 2025-06-12 DIAGNOSIS — N32.81 OAB (OVERACTIVE BLADDER): ICD-10-CM

## 2025-06-12 DIAGNOSIS — I48.0 PAROXYSMAL ATRIAL FIBRILLATION (MULTI): Primary | ICD-10-CM

## 2025-06-12 PROCEDURE — 99214 OFFICE O/P EST MOD 30 MIN: CPT | Performed by: STUDENT IN AN ORGANIZED HEALTH CARE EDUCATION/TRAINING PROGRAM

## 2025-06-12 RX ORDER — VIBEGRON 75 MG/1
75 TABLET, FILM COATED ORAL DAILY
Qty: 90 TABLET | Refills: 2 | Status: SHIPPED | OUTPATIENT
Start: 2025-06-12 | End: 2026-03-09

## 2025-06-12 RX ORDER — VIBEGRON 75 MG/1
75 TABLET, FILM COATED ORAL DAILY
Qty: 84 TABLET | Refills: 0 | COMMUNITY
Start: 2025-06-12 | End: 2025-09-04

## 2025-06-12 NOTE — PROGRESS NOTES
INR therapeutic at 3.0.  Left message on answering machine to stay on the same regimen of 5 mg Monday Fridays and 2.5 mg remaining 5 days and repeat INR in 2 weeks.

## 2025-06-12 NOTE — PROGRESS NOTES
"HISTORY OF PRESENT ILLNESS:  Carolann Meyers is a 75 y.o. female who presents today for a follow up visit. She reports she is doing well with the gemtesa. She has no complaints or concerns at this time.           Past Medical History  She has a past medical history of Allergic, Arthritis (2024), Depression (1995), Diabetes mellitus (Multi) (2012), Eczema (2020), HL (hearing loss) (2018”), Hypertension (2023’), Personal history of other diseases of the circulatory system (08/20/2021), Personal history of other diseases of the circulatory system (08/20/2021), Personal history of other endocrine, nutritional and metabolic disease (08/20/2021), Personal history of other mental and behavioral disorders (08/20/2021), and Urinary tract infection.    Surgical History  She has a past surgical history that includes Other surgical history (08/20/2021); Other surgical history (08/20/2021); Other strabismus surgery (Bilateral, 11/2023); Breast biopsy (Right, 02/25/2025); Rentz tooth extraction (1968); Hysterectomy (1998); Tonsillectomy (1958); and Breast lumpectomy w/ needle localization (Right, 05/05/2025).     Social History  She reports that she has never smoked. She has never been exposed to tobacco smoke. She has never used smokeless tobacco. She reports that she does not currently use alcohol. She reports that she does not use drugs.    Family History  Family History[1]     Allergies  Nitrofurantoin macrocrystal, Latex, and Tioconazole      A comprehensive 10+ review of systems was negative except for: see hpi                          PHYSICAL EXAMINATION:  BP Readings from Last 3 Encounters:   05/31/25 142/74   05/29/25 104/60   05/21/25 114/72      Wt Readings from Last 3 Encounters:   05/31/25 76.7 kg (169 lb)   05/29/25 77.3 kg (170 lb 6.7 oz)   05/21/25 77.1 kg (170 lb)      BMI: Estimated body mass index is 29.01 kg/m² as calculated from the following:    Height as of 5/31/25: 1.626 m (5' 4\").    Weight as of " "5/31/25: 76.7 kg (169 lb).  BSA: Estimated body surface area is 1.86 meters squared as calculated from the following:    Height as of 5/31/25: 1.626 m (5' 4\").    Weight as of 5/31/25: 76.7 kg (169 lb).  HEENT: Normocephalic, atraumatic, PER EOMI, nonicteric, trachea normal, thyroid normal, oropharynx normal.  CARDIAC: regular rate & rhythm, S1 & S2 normal.  No heaves, thrills, gallops or murmurs.  LUNGS: Clear to auscultation, no spinal or CV tenderness.  EXTREMITIES: No evidence of cyanosis, clubbing or edema.               Assessment:  75 y.o. female presents with OAB       OAB:   Rx gemtesa   Will offset cost with samples  Doing well       Follow up in 6 months       All questions and concerns were answered and addressed.  The patient expressed understanding and agrees with the plan.     Ruy Hunt MD    Scribe Attestation  By signing my name below, I, Jeanineroldan Calle, Scribe   attest that this documentation has been prepared under the direction and in the presence of Ruy Hunt MD.         [1]   Family History  Problem Relation Name Age of Onset    Other (hay fever) Mother Darlene Clinton     Arthritis Mother Darlene Clinton     Mental illness Mother Darlene Clinton     Depression Mother Darlene Clinton     Hearing loss Mother Darlene Clinton     Arthritis Father Channing Clinton     Cancer Father Channing Clinton     Diabetes Father Channing Clinton     Heart disease Father Channing Clinton     Hearing loss Father Channing Clinton     Diabetes Brother Ruy Clinton     Atrial fibrillation Brother Ruy Levineford     Hearing loss Brother Ruy Clinton     Other (hay fever) Son      Diabetes Other siblings     Heart disease Other siblings     Alcohol abuse Brother Maximilian Clinton     Diabetes Brother Maximilian Mary Beth     Hearing loss Brother Maximilian Clinton     Hypertension Brother Maximilian Clinton      "

## 2025-06-12 NOTE — TELEPHONE ENCOUNTER
Therapeutic at 3.0.  Left message on answering machine to continue current regimen and recheck INR in 2 weeks.

## 2025-06-26 ENCOUNTER — APPOINTMENT (OUTPATIENT)
Dept: RADIATION ONCOLOGY | Facility: CLINIC | Age: 75
End: 2025-06-26
Payer: MEDICARE

## 2025-07-10 ENCOUNTER — HOSPITAL ENCOUNTER (OUTPATIENT)
Dept: RADIATION ONCOLOGY | Facility: CLINIC | Age: 75
Setting detail: RADIATION/ONCOLOGY SERIES
Discharge: HOME | End: 2025-07-10
Payer: MEDICARE

## 2025-07-10 VITALS
TEMPERATURE: 97.9 F | HEIGHT: 63 IN | HEART RATE: 66 BPM | DIASTOLIC BLOOD PRESSURE: 65 MMHG | OXYGEN SATURATION: 95 % | SYSTOLIC BLOOD PRESSURE: 120 MMHG | WEIGHT: 166.67 LBS | RESPIRATION RATE: 18 BRPM | BODY MASS INDEX: 29.53 KG/M2

## 2025-07-10 DIAGNOSIS — D05.11 DUCTAL CARCINOMA IN SITU (DCIS) OF RIGHT BREAST: Primary | ICD-10-CM

## 2025-07-10 PROCEDURE — 99205 OFFICE O/P NEW HI 60 MIN: CPT | Performed by: STUDENT IN AN ORGANIZED HEALTH CARE EDUCATION/TRAINING PROGRAM

## 2025-07-10 PROCEDURE — 99215 OFFICE O/P EST HI 40 MIN: CPT | Performed by: STUDENT IN AN ORGANIZED HEALTH CARE EDUCATION/TRAINING PROGRAM

## 2025-07-10 ASSESSMENT — ENCOUNTER SYMPTOMS
ENDOCRINE NEGATIVE: 1
PSYCHIATRIC NEGATIVE: 1
LOSS OF SENSATION IN FEET: 0
DEPRESSION: 0
OCCASIONAL FEELINGS OF UNSTEADINESS: 1
EYES NEGATIVE: 1
FATIGUE: 1
RESPIRATORY NEGATIVE: 1
HEMATOLOGIC/LYMPHATIC NEGATIVE: 1
MUSCULOSKELETAL NEGATIVE: 1
GASTROINTESTINAL NEGATIVE: 1
CARDIOVASCULAR NEGATIVE: 1
NEUROLOGICAL NEGATIVE: 1

## 2025-07-10 ASSESSMENT — PAIN SCALES - GENERAL: PAINLEVEL_OUTOF10: 2

## 2025-07-10 NOTE — PROGRESS NOTES
"Radiation Oncology Outpatient Consult    Patient Name:  Carolann Meyers  MRN:  [id number]  :  1950    Referring Provider: Stu Santiago MD  Primary Care Provider: MONICA Gee  Care Team: Patient Care Team:  MONICA Gee as PCP - General  MONICA Gee as PCP - [employer] PCP  Stu Santiago MD as Medical Oncologist (Hematology and Oncology)    Date of Service: 7/10/2025     SUBJECTIVE  History of Present Illness:  Carolann Meyers \"Sheron\" is a 75 y.o. female who was referred by Stu Santiago MD, for a consultation to the [facility] Department of Radiation Oncology.  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     #) Right breast, DCIS, pTis (DCIS) pNx, G2-3, single focus of ductal carcinoma in situ, measuring 3 mm with solid pattern, All margins negative for DCIS with closest anterior margin of 6 mm, status post right partial mastectomy    Ms. Meyers is a postmenopausal female that was found to have a screen detected abnormality. There were no masses, skin changes, nipple discharge or bleeding. The patient has a history of bilateral screening mammogram with tomosynthesis on 7/3/2023 which showed no evidence of calcifications, masses or distortions with scattered areas of fibroglandular tissue.  The patient had repeat bilateral screening mammogram with tomosynthesis on 2024 which showed new calcifications and heterogeneously dense breasts of the central, lateral right breast at anterior depth.  There was no evidence of masses or calcifications in the left breast.  The patient underwent right diagnostic mammogram and ultrasound on 2024 which showed heterogeneous calcifications in the subareolar space of the right breast with mild distortion, targeted ultrasound of the right breast at 9:00, 5 cm from the nipple revealed " heterogeneous breast tissue without evidence of solid lesions.  The patient underwent stereotactic right breast biopsy on 2025 which showed calcifications up to 9 mm, pathology revealed ductal carcinoma in situ, solid pattern, intermediate to high-grade with necrosis and associated calcifications, ER strongly positive greater than 95%.    The patient had bilateral breast MRI on 3/25/2025 which revealed in the right breast heterogeneous non-mass enhancement seen in the central lateral right breast at anterior depth with biopsy tissue marker measuring 2.1 x 1.6 x 1.3 cm.  No evidence of right axillary or internal mammary lymphadenopathy.  No evidence of suspicious mass or non-mass enhancement in the left breast.  No evidence of left axillary or internal mammary lymphadenopathy.  The patient underwent right partial mastectomy on 2025 which showed residual ductal carcinoma in situ, solid type, G2-G3 with necrosis, measuring 3 mm.  All all margins negative for DCIS with the closest anterior margin of 6 mm.    The patient recently was evaluated for from standing height on 2025.  The patient underwent evaluation with CT imaging which showed wedge fracture of T11 anteriorly, without retropulsion.          Onset of menses - 11  Onset of menopause - s/p hysterectomy in   Post-menopausal bleeding - None  OCP use: 5 years  Estrogen replacement: None      Prior Radiotherapy:  No radiation treatments to show. (Treatments may have been administered in another system.)       Past Medical History:  Medical History[1]     Past Surgical History:  Surgical History[2]     Family History:  Cancer-related family history includes Cancer in her father.    Social History:  Social History[3]    Allergies:  Allergies[4]     Medications:  Current Medications[5]      Review of Systems:  Review of Systems   Constitutional:  Positive for fatigue.   HENT:  Negative.     Eyes: Negative.    Respiratory: Negative.    "  Cardiovascular: Negative.    Gastrointestinal: Negative.    Endocrine: Negative.    Genitourinary:  Positive for bladder incontinence.    Musculoskeletal: Negative.    Skin: Negative.    Neurological: Negative.    Hematological: Negative.    Psychiatric/Behavioral: Negative.         Performance Status:  The Karnofsky performance scale today is 80, Normal activity with effort; some signs or symptoms of disease (ECOG equivalent 1).        OBJECTIVE  /65   Pulse 66   Temp 36.6 °C (97.9 °F) (Skin)   Resp 18   Ht 1.6 m (5' 3\")   Wt 75.6 kg (166 lb 10.7 oz)   LMP  (LMP Unknown)   SpO2 95%   BMI 29.52 kg/m²    Physical Exam  Vitals and nursing note reviewed.   Eyes:      Extraocular Movements: Extraocular movements intact.   Cardiovascular:      Rate and Rhythm: Normal rate and regular rhythm.   Pulmonary:      Effort: Pulmonary effort is normal.   Chest:      Comments: Deferral of breast exam per patient preference  Musculoskeletal:         General: Normal range of motion.      Comments: Ambulating with walker   Neurological:      General: No focal deficit present.      Mental Status: She is alert.          Laboratory Review:  There are no laboratory contraindications to radiation therapy.    The pertinent lab results were reviewed and discussed with the patient.  Notably,     5/31/2025-CBC with differential: ANC 6.93 (H)  5/31/2025-CMP: Glucose 178 (H), BUN 28 (H), creatinine 1.02    Pathology Review:  The pertinent pathology results were reviewed and discussed with the patient.  Notably,     5/5/2025-A. RIGHT BREAST, LUMPECTOMY: Residual ductal carcinoma in situ, solid type, intermediate to high nuclear grade, with necrosis, single focus of ductal carcinoma in situ, measuring 3 mm with solid pattern, G2-3.  All margins negative for DCIS with closest anterior margin of 6 mm.  Atypical intraductal hyperplasia. Sclerosing adenosis, duct ectasia, apocrine change and intraductal hyperplasia, usual type. " Findings related to previous biopsy site.  Comment: A focus of residual DCIS is present at the previous biopsy site and measures 3 mm. The margins of excision are free of DCIS. Invasive carcinoma is not seen.     B. SKIN OF RIGHT BREAST, EXCISION:  Scar, negative for malignancy.  pTis (DCIS) pNx    2/25/2025-A. Right breast calcifications, stereotactic-guided, vacuum-assisted core biopsy: -- Ductal carcinoma in situ, solid pattern, intermediate to high nuclear grade with necrosis and associated calcifications, Note: Immunohistochemical stains were performed to assess for microinvasion and further characterize the lesion. Immunohistochemical stain for cytokeratin AE1/3 is positive in epithelial cells, SMMH and p63 highlight a myoepithelial cell layer and e-cadherin is positive supporting ductal phenotype. The morphologic and immunophenotypic findings support a diagnosis of ductal carcinoma in situ with no evidence of microinvasion.    Disease Associated Genomics:  Right breast DCIS: ER strongly positive greater than 95%     Imaging:  The pertinent imaging results were reviewed and discussed with the patient.  Notably,     3/25/2025-MRI breast bilateral: In the right breast heterogeneous non-mass enhancement seen in the central lateral right breast at anterior depth with biopsy tissue marker measuring 2.1 x 1.6 x 1.3 cm.  No evidence of right axillary or internal mammary lymphadenopathy.  No evidence of suspicious mass or non-mass enhancement in the left breast.  No evidence of left axillary or internal mammary lymphadenopathy.    2/25/2025-stereotactic right breast biopsy: Calcifications measuring up to 9 mm.    12/23/2024-right diagnostic mammogram and ultrasound: Heterogeneously dense breast.  Small group of heterogeneous calcifications in the subareolar space of the right breast with associated mild distortion.  Targeted ultrasound of the right breast at 9:00, 5 cm from the nipple revealed heterogeneous breast  tissue with no evidence of solid lesions.    11/21/2024-bilateral screening mammogram with tomosynthesis: Heterogeneously dense breast.  New calcifications are noted in the central lateral right breast at anterior depth.  No evidence of masses or calcifications in the left breast.  (BI-RADS 0)    7/3/2023-bilateral screening mammogram with tomosynthesis: Scattered areas of fibroglandular tissue.  No evidence of masses, calcifications or distortions.  (BI-RADS 1)       Prior Systemic Therapies:  None    Prior Surgeries:  5/5/2025-right partial mastectomy    Prior Radiation Therapy:  None    ASSESSMENT:   Carolann Meyers is a 75 y.o. female with Ductal carcinoma in situ (DCIS) of breast, Clinical: Stage 0 (cTis (DCIS), cN0, cM0, ER+)  Ductal carcinoma in situ (DCIS) of breast, Pathologic: Stage 0 (pTis (DCIS), pN0, cM0, ER+).      Ms. Meyers is a postmenopausal female with right breast, DCIS, pTis (DCIS) pNx, G2-3, single focus of ductal carcinoma in situ, measuring 3 mm with solid pattern, All margins negative for DCIS with closest anterior margin of 6 mm, status post right partial mastectomy.     I discussed with the patient regarding her clinical presentation, pathology, imaging and treatment recommendations for hormone positive, intermediate to high-grade DCIS.  I discussed with the patient regarding risk of recurrence for both low risk DCIS and high risk DCIS.  I discussed with the patient given her biopsy showing findings of potential high-grade DCIS, conservatively I would treat her as high risk DCIS, however the majority of her specimen on resection was intermediate grade.  I discussed with the patient regarding the benefit of the decreasing ipsilateral breast tumor recurrence of DCIS or IDC.    I discussed with the patient regarding treatment options including observation with AI alone, but less favored given the concern for high-grade DCIS features on biopsy.  I discussed with the patient regarding  radiation therapy options including conditional recommendations for partial breast radiation therapy or treatment with whole breast radiation therapy with accelerated hypofractionation or hypofractionation with or without tumor bed boost. I discussed with the patient regarding acute and long-term toxicities to radiation therapy to the right whole breast or right partial breast.    Extensive discussion the patient would like to proceed with radiation therapy omission and take aromatase inhibitor alone.  The patient has contact information for the clinic if the patient decides to proceed with radiation therapy within a reasonable timeframe from surgery.    The patient has contact information for the clinic for any additional future needs.    PLAN:     #) Right breast, DCIS, pTis (DCIS) pNx, G2-3, single focus of ductal carcinoma in situ, measuring 3 mm with solid pattern, All margins negative for DCIS with closest anterior margin of 6 mm, status post right partial mastectomy  -Discussed radiation therapy options including omission given intermediate grade of the resection specimen; however given high-grade features on biopsy I discussed a favored approach with radiation treatment options include conditional recommendation for partial breast radiation therapy or accelerated hypofractionated or hypofractionated radiation therapy to the whole breast with or without tumor bed boost  - The patient has elected to undergo omission of radiation therapy and take aromatase inhibitor alone  - Following with medical oncology in 9/2025, planned for AI  - Following with breast surgery in 11/2025    NCCN Guidelines were applicable to guide this patients treatment plan.     A total of 45 minutes were spent face-to-face with the patient, the majority of time spent detailing treatment options with an additional 15 minutes spent reviewing records including imaging, pathology and physician notes.    Samson Donovan MD  [facility] [place]  [facility]  - Department of Radiation Oncology  Phone: [phone]  Fax: [phone]  [facility] secure chat preferred / Pager [phone]    Note: This was transcribed using Dragon voice recognition software. Attempts were made to correct any errors; however, errors or omissions may be present.               [1]   Past Medical History:  Diagnosis Date    Allergic     Arthritis 2024    Breast cancer Jan 2025    Depression 1995    Diabetes mellitus (Multi) 2012    Eczema 2020    HL (hearing loss) 2018”    Hypertension 2023’    Personal history of other diseases of the circulatory system 08/20/2021    History of atrial fibrillation    Personal history of other diseases of the circulatory system 08/20/2021    History of hypertension    Personal history of other endocrine, nutritional and metabolic disease 08/20/2021    History of type 2 diabetes mellitus    Personal history of other mental and behavioral disorders 08/20/2021    History of depression    Urinary tract infection    [2]   Past Surgical History:  Procedure Laterality Date    BREAST BIOPSY Right 02/25/2025    BREAST LUMPECTOMY W/ NEEDLE LOCALIZATION Right 05/05/2025    HYSTERECTOMY  1998    OTHER STRABISMUS SURGERY Bilateral 11/2023    cataracts    OTHER SURGICAL HISTORY  08/20/2021    Hemithyroidectomy    OTHER SURGICAL HISTORY  08/20/2021    Hysterectomy    THYROID SURGERY  2019    TONSILLECTOMY  1958    WISDOM TOOTH EXTRACTION  1968   [3]   Social History  Tobacco Use    Smoking status: Never     Passive exposure: Never    Smokeless tobacco: Never   Vaping Use    Vaping status: Never Used   Substance Use Topics    Alcohol use: Not Currently     Comment: occ    Drug use: Never   [4]   Allergies  Allergen Reactions    Nitrofurantoin Macrocrystal Unknown    Latex Itching    Tioconazole Unknown   [5]   Current Outpatient Medications:     Accu-Chek Zayra Plus test strp strip, USE 2 TIMES A DAY, Disp: 100 each, Rfl: 2     atorvastatin (Lipitor) 20 mg tablet, Take 1 tablet (20 mg) by mouth once daily., Disp: 100 tablet, Rfl: 3    blood sugar diagnostic (ACCU-CHEK AMIRAH MISC), as directed external as directed for 1 year, Disp: , Rfl:     buPROPion XL (Wellbutrin XL) 150 mg 24 hr tablet, Take 1 tablet (150 mg) by mouth once daily in the morning., Disp: , Rfl:     diclofenac sodium (Voltaren) 1 % gel, Apply 4.5 inches (4 g) topically 4 times a day., Disp: 100 g, Rfl: 1    empagliflozin (Jardiance) 10 mg tablet, Take 1 tablet (10 mg) by mouth once daily., Disp: 30 tablet, Rfl: 2    fluticasone (Flonase) 50 mcg/actuation nasal spray, Administer 1 spray into each nostril once daily., Disp: 16 g, Rfl: 11    hydroCHLOROthiazide (HYDRODiuril) 25 mg tablet, TAKE ONE TABLET BY MOUTH DAILY, Disp: 90 tablet, Rfl: 3    ketoconazole (NIZOral) 2 % cream, Apply topically once daily as needed., Disp: , Rfl:     ketoconazole (NIZOral) 2 % shampoo, APPLY TOPICALLY TO SCALP 2 TO 3 TIMES A WEEK., Disp: , Rfl:     lancets misc, Inject under the skin 2 times a day. As directed, Disp: , Rfl:     letrozole (Femara) 2.5 mg tablet, Take 1 tablet (2.5 mg total) by mouth once daily.  Take with or without food., Disp: 60 tablet, Rfl: 5    levothyroxine (Synthroid, Levoxyl) 75 mcg tablet, Take 1 tablet (75 mcg) by mouth once daily., Disp: 90 tablet, Rfl: 2    lidocaine (Lidoderm) 5 % patch, Place 1 patch over 12 hours on the skin once daily. Remove & discard patch within 12 hours or as directed by MD., Disp: 7 patch, Rfl: 0    losartan (Cozaar) 100 mg tablet, Take 1 tablet (100 mg) by mouth once daily., Disp: 90 tablet, Rfl: 3    sertraline (Zoloft) 100 mg tablet, Take 1 tablet (100 mg) by mouth once daily., Disp: , Rfl:     SITagliptin phosphate (Januvia) 100 mg tablet, Take 1 tablet (100 mg) by mouth once daily., Disp: 90 tablet, Rfl: 3    sotalol (Betapace) 80 mg tablet, TAKE 1 TABLET BY MOUTH TWICE DAILY, Disp: 180 tablet, Rfl: 3    traZODone (Desyrel) 100 mg  tablet, Take 1 tablet (100 mg) by mouth once daily., Disp: 90 tablet, Rfl: 2    vibegron (Gemtesa) 75 mg tablet, Take 1 tablet (75 mg) by mouth once daily. [id number] 1/29, Disp: 84 tablet, Rfl: 0    vibegron (Gemtesa) 75 mg tablet, Take 1 tablet (75 mg) by mouth early in the morning.., Disp: 90 tablet, Rfl: 2    warfarin (Coumadin) 2.5 mg tablet, Take 1 tablet (2.5 mg) by mouth see administration instructions. 1 tablet oral Mondays and Thursdays only, Disp: 24 tablet, Rfl: 3    warfarin (Coumadin) 5 mg tablet, Take 1 tablet (5 mg) by mouth see administration instructions., Disp: 90 tablet, Rfl: 3

## 2025-07-10 NOTE — PROGRESS NOTES
Radiation Oncology Nursing Note    Prior Radiotherapy:  No  No radiation treatments to show. (Treatments may have been administered in another system.)     Current Systemic Treatment:  No     Presence of Pacemaker or ICD:  No    History of Autoimmune or Connective Tissue Disorders:  No    Pain: The patient's current pain level was assessed.  They report currently having a pain of 2 out of 10.  They feel their pain is under control with the use of pain medications.    Review of Systems:  Review of Systems   Constitutional:  Positive for fatigue.   HENT:  Negative.     Eyes: Negative.    Respiratory: Negative.     Cardiovascular: Negative.    Gastrointestinal: Negative.    Endocrine: Negative.    Genitourinary:  Positive for bladder incontinence.    Musculoskeletal: Negative.    Skin: Negative.    Neurological: Negative.    Hematological: Negative.    Psychiatric/Behavioral: Negative.       Patient is in today for consult visit with Dr Donovan. Patient had surgery 5/5/25. Today she has 2/10 pain in her back that is controlled with over the counter medication. She reports full ROM and some fatigue. The patient denies post operative infection as well as swelling in the arm, She will see Stu 9/8 and [place] 11/13. Patient was given office information should she need to contact us in the future.     Education Documentation  When and How to Contact Clinic, taught by Destiney Olea RN at 7/10/2025  3:11 PM.  Learner: Patient  Readiness: Acceptance  Method: Explanation  Response: Verbalizes Understanding    Education Comments  No comments found.

## 2025-07-17 ENCOUNTER — OFFICE VISIT (OUTPATIENT)
Dept: PRIMARY CARE | Facility: CLINIC | Age: 75
End: 2025-07-17
Payer: MEDICARE

## 2025-07-17 VITALS
SYSTOLIC BLOOD PRESSURE: 122 MMHG | OXYGEN SATURATION: 98 % | TEMPERATURE: 97.1 F | HEART RATE: 67 BPM | RESPIRATION RATE: 18 BRPM | DIASTOLIC BLOOD PRESSURE: 82 MMHG | WEIGHT: 165 LBS | BODY MASS INDEX: 29.23 KG/M2 | HEIGHT: 63 IN

## 2025-07-17 DIAGNOSIS — S22.000A COMPRESSION FRACTURE OF BODY OF THORACIC VERTEBRA (MULTI): ICD-10-CM

## 2025-07-17 DIAGNOSIS — G89.29 CHRONIC BILATERAL THORACIC BACK PAIN: Primary | ICD-10-CM
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
## 2025-07-17 DIAGNOSIS — M54.6 CHRONIC BILATERAL THORACIC BACK PAIN: Primary | ICD-10-CM

## 2025-07-17 PROCEDURE — 1036F TOBACCO NON-USER: CPT | Performed by: NURSE PRACTITIONER

## 2025-07-17 PROCEDURE — 99214 OFFICE O/P EST MOD 30 MIN: CPT | Performed by: NURSE PRACTITIONER

## 2025-07-17 PROCEDURE — 1125F AMNT PAIN NOTED PAIN PRSNT: CPT | Performed by: NURSE PRACTITIONER

## 2025-07-17 PROCEDURE — 3079F DIAST BP 80-89 MM HG: CPT | Performed by: NURSE PRACTITIONER

## 2025-07-17 PROCEDURE — 1159F MED LIST DOCD IN RCRD: CPT | Performed by: NURSE PRACTITIONER

## 2025-07-17 PROCEDURE — 3074F SYST BP LT 130 MM HG: CPT | Performed by: NURSE PRACTITIONER

## 2025-07-17 ASSESSMENT — ENCOUNTER SYMPTOMS
ARTHRALGIAS: 1
BACK PAIN: 1
FATIGUE: 1

## 2025-07-17 ASSESSMENT — PAIN SCALES - GENERAL: PAINLEVEL_OUTOF10: 1

## 2025-07-17 ASSESSMENT — PATIENT HEALTH QUESTIONNAIRE - PHQ9
1. LITTLE INTEREST OR PLEASURE IN DOING THINGS: NOT AT ALL
SUM OF ALL RESPONSES TO PHQ9 QUESTIONS 1 AND 2: 0
2. FEELING DOWN, DEPRESSED OR HOPELESS: NOT AT ALL

## 2025-07-17 NOTE — PROGRESS NOTES
"Subjective   Patient ID: Sheron Meyers is a 75 y.o. female who presents for Follow-up (PT IS HERE TO FOLLOW UP FROM FALL 5/30/2025).    Since her last visit has had a breast Bx that was positive for breast cancer, decided to take meds follow up in sept  with dr Carias. Had a fall end of may and has a commuted fracture in her back , wants to know how much longer she will be in pain, has been through a lot ,    Back Pain  This is a new problem. The current episode started more than 1 month ago. The problem occurs daily. The problem has been gradually improving since onset. The pain is present in the lumbar spine. The quality of the pain is described as aching. The pain is at a severity of 2/10. The pain is Worse during the day. The symptoms are aggravated by coughing, lying down and sitting. Stiffness is present In the morning. Risk factors include history of cancer and recent trauma. She has tried analgesics, NSAIDs and muscle relaxant for the symptoms. The treatment provided mild relief.        Review of Systems   Constitutional:  Positive for fatigue.   Musculoskeletal:  Positive for arthralgias and back pain.       Objective   /82   Pulse 67   Temp 36.2 °C (97.1 °F)   Resp 18   Ht 1.6 m (5' 3\")   Wt 74.8 kg (165 lb) Comment: PER PT  LMP  (LMP Unknown)   SpO2 98%   BMI 29.23 kg/m²     Physical Exam  Constitutional:       Appearance: Normal appearance.   HENT:      Nose: Nose normal.     Cardiovascular:      Rate and Rhythm: Normal rate and regular rhythm.     Musculoskeletal:      Comments: Has pain where she had fractured her back   pain is becoming more bearable but its daily using a cane now  was on a walker,     Skin:     General: Skin is warm.     Neurological:      Mental Status: She is alert and oriented to person, place, and time.     Psychiatric:      Comments: Pt doing well  considering all she has been through ,          Assessment/Plan     Discussed it take time offered physical therapy , " if not resolving should see dr cleary. Jori.

## 2025-07-18 ENCOUNTER — TELEPHONE (OUTPATIENT)
Facility: CLINIC | Age: 75
End: 2025-07-18
Payer: MEDICARE

## 2025-07-18 ENCOUNTER — ANTICOAGULATION - WARFARIN VISIT (OUTPATIENT)
Facility: CLINIC | Age: 75
End: 2025-07-18
Payer: MEDICARE

## 2025-07-18 LAB
INR IN PPP BY COAGULATION ASSAY EXTERNAL: 2.2
PROTHROMBIN TIME (PT) IN PPP BY COAGULATION ASSAY EXTERNAL: NORMAL

## 2025-07-18 NOTE — TELEPHONE ENCOUNTER
Patient home INR 7/18/25 is 2.2  Current warfarin dose   5 mg Monday Fridays and 2.5 mg remaining 5 days

## 2025-07-18 NOTE — TELEPHONE ENCOUNTER
Called patient to advise, INR result good. No change to warfarin. Recheck in 4 weeks. No answer, left message for call back

## 2025-07-18 NOTE — PROGRESS NOTES
Tried calling patient to advise, no change in warfarin dose and re-check in 4 weeks. Left message for call ricky

## 2025-08-04 DIAGNOSIS — E11.9 DIABETES MELLITUS WITHOUT COMPLICATION: ICD-10-CM

## 2025-08-04 RX ORDER — ATORVASTATIN CALCIUM 20 MG/1
20 TABLET, FILM COATED ORAL DAILY
Qty: 100 TABLET | Refills: 3 | Status: SHIPPED | OUTPATIENT
Start: 2025-08-04 | End: 2026-09-08

## 2025-08-04 NOTE — PROGRESS NOTES
"Radiation Oncology Outpatient Consult    Patient Name:  Carolann Meyers  MRN:  [id number]  :  1950    Referring Provider: No ref. provider found  Primary Care Provider: MONICA Gee  Care Team: Patient Care Team:  MONICA Gee as PCP - General  MONICA Gee as PCP - [employer] PCP  Stu Santiago MD as Medical Oncologist (Hematology and Oncology)    Date of Service: 2025     SUBJECTIVE  History of Present Illness:  Carolann Meyers \"Sheron\" is a 75 y.o. female who was referred by No ref. provider found, for a consultation to the [facility] Department of Radiation Oncology.  She is presenting for evaluation and management of her ductal carcinoma in situ of the right breast.    2024: Mammogram revealed new calcifications in the lateral right breast.    2024: Diagnostic right mammogram revealed heterogeneous calcifications in the subareolar right breast with a suggestion of an associated area of mild asymmetry.  Ultrasound was negative.    2025: Right breast biopsy revealed ductal carcinoma in situ, grade 2-3.  Estrogen receptors stained positive at greater than 95%.    3/25/2025: Breast MRI revealed a 1.2 x 1.6 x 1.3 cm area of non-mass enhancement in the central/lateral right breast.    2025: Right partial mastectomy revealed a 0.3 cm ductal carcinoma in situ of the solid subtype, grade 2.  The resection margins were negative with the closest being 0.6 cm.    She met with my colleague Dr. Donovan and has declined adjuvant radiation.  She did initiate letrozole.  Ms. Meyers asked to meet with me for a second opinion regarding the role of radiation.    Prior Radiotherapy:  No radiation treatments to show. (Treatments may have been administered in another system.)       Past Medical History:  Medical History[1]     Past Surgical History:  Surgical History[2]     Family History:  Cancer-related " family history includes Cancer in her father.    Social History:  Social History[3]    Gynecologic History: Menarche age 10.  .    Allergies:  Allergies[4]     Medications:  Current Medications[5]      Review of Systems:  Review of Systems - Oncology    Performance Status:  The Karnofsky performance scale today is 90, Able to carry on normal activity; minor signs or symptoms of disease (ECOG equivalent 0).        OBJECTIVE  LMP  (LMP Unknown)    Physical Exam  Constitutional:       Appearance: Normal appearance.     Neurological:      Mental Status: She is alert.          Laboratory Review:  There are no laboratory contraindications to radiation therapy.      Pathology Review:  The pertinent pathology results were reviewed and discussed with the patient.  See history for details.    Imaging:  The pertinent imaging results were reviewed and discussed with the patient.  See history for details.       ASSESSMENT:   Carolann Meyers is a 75 y.o. female with Ductal carcinoma in situ (DCIS) of breast, Clinical: Stage 0 (cTis (DCIS), cN0, cM0, ER+)  Ductal carcinoma in situ (DCIS) of breast, Pathologic: Stage 0 (pTis (DCIS), pN0, cM0, ER+).  She is status post right partial mastectomy.  She has started adjuvant letrozole.    PLAN:   I had a conversation with the patient detailing the role of radiation in the management of this newly diagnosed ductal carcinoma in situ.  She wanted me to estimate of 10-year risk of recurrence.  It is not clear whether this is a grade 2 or grade 3 DCIS based on complexity and data in the pathology report however it was small with widely negative margins.  I estimated a 10-year local recurrence risk of 15% without radiation half of which would be DCIS and the other half being invasive.  We discussed that radiation would further reduce the risk of an invasive cancer to about 2 to 3%.  We discussed that there was no anticipated survival advantage.  She is inclined not to proceed with  radiation and understands that she should have a mammogram in November.  She is complaining of back pain and had a CT of the spine performed in the emergency room at the end of May.  I have forwarded this result to her primary care nurse practitioner.  I have asked her to reach out with any additional questions or concerns.    NCCN Guidelines were applicable to guide this patients treatment plan.   Joyce Yeager MD            [1]   Past Medical History:  Diagnosis Date    Allergic     Arthritis 2024    Breast cancer Jan 2025    Depression 1995    Diabetes mellitus (Multi) 2012    Eczema 2020    HIV disease (Multi)     HL (hearing loss) 2018”    Hypertension 2023’    Personal history of other diseases of the circulatory system 08/20/2021    History of atrial fibrillation    Personal history of other diseases of the circulatory system 08/20/2021    History of hypertension    Personal history of other endocrine, nutritional and metabolic disease 08/20/2021    History of type 2 diabetes mellitus    Personal history of other mental and behavioral disorders 08/20/2021    History of depression    Urinary tract infection    [2]   Past Surgical History:  Procedure Laterality Date    BREAST BIOPSY Right 02/25/2025    BREAST LUMPECTOMY W/ NEEDLE LOCALIZATION Right 05/05/2025    HYSTERECTOMY  1998    OTHER STRABISMUS SURGERY Bilateral 11/2023    cataracts    OTHER SURGICAL HISTORY  08/20/2021    Hemithyroidectomy    OTHER SURGICAL HISTORY  08/20/2021    Hysterectomy    THYROID SURGERY  2019    TONSILLECTOMY  1958    WISDOM TOOTH EXTRACTION  1968   [3]   Social History  Tobacco Use    Smoking status: Never     Passive exposure: Never    Smokeless tobacco: Never   Vaping Use    Vaping status: Never Used   Substance Use Topics    Alcohol use: Not Currently     Comment: occ    Drug use: Never   [4]   Allergies  Allergen Reactions    Nitrofurantoin Macrocrystal Unknown    Latex Itching    Tioconazole Unknown   [5]   Current  Outpatient Medications:     Accu-Chek Zayra Plus test strp strip, USE 2 TIMES A DAY, Disp: 100 each, Rfl: 2    atorvastatin (Lipitor) 20 mg tablet, Take 1 tablet (20 mg) by mouth once daily., Disp: 100 tablet, Rfl: 3    blood sugar diagnostic (ACCU-CHEK ZAYRA MISC), as directed external as directed for 1 year, Disp: , Rfl:     buPROPion XL (Wellbutrin XL) 150 mg 24 hr tablet, Take 1 tablet (150 mg) by mouth once daily in the morning., Disp: , Rfl:     diclofenac sodium (Voltaren) 1 % gel, Apply 4.5 inches (4 g) topically 4 times a day. (Patient not taking: Reported on 7/17/2025), Disp: 100 g, Rfl: 1    empagliflozin (Jardiance) 10 mg tablet, Take 1 tablet (10 mg) by mouth once daily., Disp: 30 tablet, Rfl: 2    fluticasone (Flonase) 50 mcg/actuation nasal spray, Administer 1 spray into each nostril once daily., Disp: 16 g, Rfl: 11    hydroCHLOROthiazide (HYDRODiuril) 25 mg tablet, TAKE ONE TABLET BY MOUTH DAILY, Disp: 90 tablet, Rfl: 3    ketoconazole (NIZOral) 2 % cream, Apply topically once daily as needed., Disp: , Rfl:     ketoconazole (NIZOral) 2 % shampoo, APPLY TOPICALLY TO SCALP 2 TO 3 TIMES A WEEK., Disp: , Rfl:     lancets misc, Inject under the skin 2 times a day. As directed, Disp: , Rfl:     letrozole (Femara) 2.5 mg tablet, Take 1 tablet (2.5 mg total) by mouth once daily.  Take with or without food., Disp: 60 tablet, Rfl: 5    levothyroxine (Synthroid, Levoxyl) 75 mcg tablet, Take 1 tablet (75 mcg) by mouth once daily., Disp: 90 tablet, Rfl: 2    lidocaine (Lidoderm) 5 % patch, Place 1 patch over 12 hours on the skin once daily. Remove & discard patch within 12 hours or as directed by MD. (Patient not taking: Reported on 7/17/2025), Disp: 7 patch, Rfl: 0    losartan (Cozaar) 100 mg tablet, Take 1 tablet (100 mg) by mouth once daily., Disp: 90 tablet, Rfl: 3    sertraline (Zoloft) 100 mg tablet, Take 1 tablet (100 mg) by mouth once daily., Disp: , Rfl:     SITagliptin phosphate (Januvia) 100 mg tablet,  Take 1 tablet (100 mg) by mouth once daily., Disp: 90 tablet, Rfl: 3    sotalol (Betapace) 80 mg tablet, TAKE 1 TABLET BY MOUTH TWICE DAILY, Disp: 180 tablet, Rfl: 3    traZODone (Desyrel) 100 mg tablet, Take 1 tablet (100 mg) by mouth once daily., Disp: 90 tablet, Rfl: 2    vibegron (Gemtesa) 75 mg tablet, Take 1 tablet (75 mg) by mouth once daily. [id number] 1/29 (Patient not taking: Reported on 7/17/2025), Disp: 84 tablet, Rfl: 0    vibegron (Gemtesa) 75 mg tablet, Take 1 tablet (75 mg) by mouth early in the morning.., Disp: 90 tablet, Rfl: 2    warfarin (Coumadin) 2.5 mg tablet, Take 1 tablet (2.5 mg) by mouth see administration instructions. 1 tablet oral Mondays and Thursdays only, Disp: 24 tablet, Rfl: 3    warfarin (Coumadin) 5 mg tablet, Take 1 tablet (5 mg) by mouth see administration instructions., Disp: 90 tablet, Rfl: 3

## 2025-08-04 NOTE — TELEPHONE ENCOUNTER
Please send the attached prescription to the patient's pharmacy as soon as possible. Thank you!    Requested Prescriptions     Pending Prescriptions Disp Refills    atorvastatin (Lipitor) 20 mg tablet 100 tablet 3     Sig: Take 1 tablet (20 mg) by mouth once daily.

## 2025-08-05 ENCOUNTER — HOSPITAL ENCOUNTER (OUTPATIENT)
Dept: RADIATION ONCOLOGY | Facility: CLINIC | Age: 75
Setting detail: RADIATION/ONCOLOGY SERIES
Discharge: HOME | End: 2025-08-05
Payer: MEDICARE

## 2025-08-05 PROCEDURE — 99213 OFFICE O/P EST LOW 20 MIN: CPT | Performed by: RADIOLOGY

## 2025-08-05 PROCEDURE — 99213 OFFICE O/P EST LOW 20 MIN: CPT | Mod: 95 | Performed by: RADIOLOGY

## 2025-08-05 ASSESSMENT — ENCOUNTER SYMPTOMS
BACK PAIN: 1
NERVOUS/ANXIOUS: 1

## 2025-08-05 ASSESSMENT — PAIN SCALES - GENERAL: PAINLEVEL_OUTOF10: 2

## 2025-08-05 NOTE — PROGRESS NOTES
Radiation Oncology Nursing Note    Prior Radiotherapy:  No  No radiation treatments to show. (Treatments may have been administered in another system.)     Current Systemic Treatment:  No     Presence of Pacemaker or ICD:  No    History of Autoimmune or Connective Tissue Disorders:  No    Pain: The patient's current pain level was assessed.  They report currently having a pain of 2 out of 10.  They feel their pain is not under control with the use of pain medications.  Mostly lower back pain.  Patient has some relief with acetaminophen and/or ibuprofen.      Review of Systems:  Review of Systems   Musculoskeletal:  Positive for back pain.   Skin:  Positive for itching (intermittent-under breasts).   Psychiatric/Behavioral:  The patient is nervous/anxious.    All other systems reviewed and are negative.

## 2025-08-13 ENCOUNTER — EVALUATION (OUTPATIENT)
Facility: CLINIC | Age: 75
End: 2025-08-13
Payer: MEDICARE

## 2025-08-13 DIAGNOSIS — R26.81 UNSTEADY GAIT: Primary | ICD-10-CM

## 2025-08-13 DIAGNOSIS — G89.29 CHRONIC BILATERAL THORACIC BACK PAIN: ICD-10-CM

## 2025-08-13 DIAGNOSIS — M54.6 CHRONIC BILATERAL THORACIC BACK PAIN: ICD-10-CM

## 2025-08-13 DIAGNOSIS — S22.000A COMPRESSION FRACTURE OF BODY OF THORACIC VERTEBRA (MULTI): ICD-10-CM

## 2025-08-13 PROCEDURE — 97161 PT EVAL LOW COMPLEX 20 MIN: CPT | Mod: GP

## 2025-08-20 ENCOUNTER — APPOINTMENT (OUTPATIENT)
Dept: PRIMARY CARE | Facility: CLINIC | Age: 75
End: 2025-08-20
Payer: MEDICARE

## 2025-08-20 VITALS
DIASTOLIC BLOOD PRESSURE: 82 MMHG | HEART RATE: 69 BPM | TEMPERATURE: 97.3 F | WEIGHT: 163 LBS | BODY MASS INDEX: 28.88 KG/M2 | OXYGEN SATURATION: 95 % | RESPIRATION RATE: 18 BRPM | HEIGHT: 63 IN | SYSTOLIC BLOOD PRESSURE: 128 MMHG

## 2025-08-20 DIAGNOSIS — R53.83 OTHER FATIGUE: ICD-10-CM

## 2025-08-20 DIAGNOSIS — Z11.59 ENCOUNTER FOR HEPATITIS C SCREENING TEST FOR LOW RISK PATIENT: ICD-10-CM

## 2025-08-20 DIAGNOSIS — E11.65 TYPE 2 DIABETES MELLITUS WITH HYPERGLYCEMIA, WITHOUT LONG-TERM CURRENT USE OF INSULIN: ICD-10-CM

## 2025-08-20 DIAGNOSIS — G89.29 CHRONIC BILATERAL THORACIC BACK PAIN: ICD-10-CM

## 2025-08-20 DIAGNOSIS — I48.91 ATRIAL FIBRILLATION, UNSPECIFIED TYPE (MULTI): ICD-10-CM

## 2025-08-20 DIAGNOSIS — Z85.3 HX OF BREAST CANCER: ICD-10-CM

## 2025-08-20 DIAGNOSIS — I10 PRIMARY HYPERTENSION: ICD-10-CM

## 2025-08-20 DIAGNOSIS — Z00.00 WELL ADULT EXAM: ICD-10-CM

## 2025-08-20 DIAGNOSIS — G47.9 SLEEP DIFFICULTIES: ICD-10-CM

## 2025-08-20 DIAGNOSIS — E03.9 HYPOTHYROIDISM, UNSPECIFIED TYPE: Primary | ICD-10-CM

## 2025-08-20 DIAGNOSIS — M54.6 CHRONIC BILATERAL THORACIC BACK PAIN: ICD-10-CM

## 2025-08-20 DIAGNOSIS — Z12.31 ENCOUNTER FOR SCREENING MAMMOGRAM FOR MALIGNANT NEOPLASM OF BREAST: ICD-10-CM

## 2025-08-20 DIAGNOSIS — Z00.00 ROUTINE MEDICAL EXAM: ICD-10-CM

## 2025-08-20 DIAGNOSIS — E55.9 VITAMIN D DEFICIENCY: ICD-10-CM

## 2025-08-20 DIAGNOSIS — E78.2 MIXED HYPERLIPIDEMIA: ICD-10-CM

## 2025-08-20 DIAGNOSIS — F32.A DEPRESSION, UNSPECIFIED DEPRESSION TYPE: ICD-10-CM

## 2025-08-20 LAB — POC HEMOGLOBIN A1C: 6.8 % (ref 4.2–6.5)

## 2025-08-20 PROCEDURE — 1159F MED LIST DOCD IN RCRD: CPT | Performed by: NURSE PRACTITIONER

## 2025-08-20 PROCEDURE — 83036 HEMOGLOBIN GLYCOSYLATED A1C: CPT | Performed by: NURSE PRACTITIONER

## 2025-08-20 PROCEDURE — 99213 OFFICE O/P EST LOW 20 MIN: CPT | Mod: 25 | Performed by: NURSE PRACTITIONER

## 2025-08-20 PROCEDURE — 1170F FXNL STATUS ASSESSED: CPT | Performed by: NURSE PRACTITIONER

## 2025-08-20 PROCEDURE — 1126F AMNT PAIN NOTED NONE PRSNT: CPT | Performed by: NURSE PRACTITIONER

## 2025-08-20 PROCEDURE — 99215 OFFICE O/P EST HI 40 MIN: CPT | Performed by: NURSE PRACTITIONER

## 2025-08-20 PROCEDURE — 3079F DIAST BP 80-89 MM HG: CPT | Performed by: NURSE PRACTITIONER

## 2025-08-20 PROCEDURE — 99213 OFFICE O/P EST LOW 20 MIN: CPT | Performed by: NURSE PRACTITIONER

## 2025-08-20 PROCEDURE — 3074F SYST BP LT 130 MM HG: CPT | Performed by: NURSE PRACTITIONER

## 2025-08-20 PROCEDURE — G0439 PPPS, SUBSEQ VISIT: HCPCS | Performed by: NURSE PRACTITIONER

## 2025-08-20 ASSESSMENT — PAIN SCALES - GENERAL: PAINLEVEL_OUTOF10: 0-NO PAIN

## 2025-08-20 ASSESSMENT — ENCOUNTER SYMPTOMS
ARTHRALGIAS: 1
BACK PAIN: 1

## 2025-08-20 ASSESSMENT — LIFESTYLE VARIABLES
HOW OFTEN DO YOU HAVE A DRINK CONTAINING ALCOHOL: MONTHLY OR LESS
HOW OFTEN DO YOU HAVE SIX OR MORE DRINKS ON ONE OCCASION: NEVER
SKIP TO QUESTIONS 9-10: 1
HOW MANY STANDARD DRINKS CONTAINING ALCOHOL DO YOU HAVE ON A TYPICAL DAY: 1 OR 2
HOW MANY STANDARD DRINKS CONTAINING ALCOHOL DO YOU HAVE ON A TYPICAL DAY: 1 OR 2
AUDIT-C TOTAL SCORE: 1
HOW OFTEN DO YOU HAVE A DRINK CONTAINING ALCOHOL: MONTHLY OR LESS
HOW OFTEN DO YOU HAVE SIX OR MORE DRINKS ON ONE OCCASION: NEVER
AUDIT-C TOTAL SCORE: 1
SKIP TO QUESTIONS 9-10: 1

## 2025-08-20 ASSESSMENT — ACTIVITIES OF DAILY LIVING (ADL)
MANAGING_FINANCES: INDEPENDENT
DOING_HOUSEWORK: INDEPENDENT
DRESSING: INDEPENDENT
BATHING: INDEPENDENT
GROCERY_SHOPPING: INDEPENDENT
TAKING_MEDICATION: INDEPENDENT

## 2025-08-21 ENCOUNTER — TREATMENT (OUTPATIENT)
Facility: CLINIC | Age: 75
End: 2025-08-21
Payer: MEDICARE

## 2025-08-21 DIAGNOSIS — R26.81 UNSTEADY GAIT: ICD-10-CM

## 2025-08-21 DIAGNOSIS — G89.29 CHRONIC BILATERAL THORACIC BACK PAIN: ICD-10-CM

## 2025-08-21 DIAGNOSIS — S22.000A COMPRESSION FRACTURE OF BODY OF THORACIC VERTEBRA (MULTI): ICD-10-CM

## 2025-08-21 DIAGNOSIS — M54.6 CHRONIC BILATERAL THORACIC BACK PAIN: ICD-10-CM

## 2025-08-21 LAB
25(OH)D3+25(OH)D2 SERPL-MCNC: 60 NG/ML (ref 30–100)
ALBUMIN SERPL-MCNC: 4.2 G/DL (ref 3.6–5.1)
ALP SERPL-CCNC: 112 U/L (ref 37–153)
ALT SERPL-CCNC: 14 U/L (ref 6–29)
ANION GAP SERPL CALCULATED.4IONS-SCNC: 10 MMOL/L (CALC) (ref 7–17)
AST SERPL-CCNC: 14 U/L (ref 10–35)
BASOPHILS # BLD AUTO: 43 CELLS/UL (ref 0–200)
BASOPHILS NFR BLD AUTO: 0.6 %
BILIRUB SERPL-MCNC: 0.4 MG/DL (ref 0.2–1.2)
BUN SERPL-MCNC: 26 MG/DL (ref 7–25)
CALCIUM SERPL-MCNC: 9.5 MG/DL (ref 8.6–10.4)
CHLORIDE SERPL-SCNC: 99 MMOL/L (ref 98–110)
CHOLEST SERPL-MCNC: 177 MG/DL
CHOLEST/HDLC SERPL: 2.3 (CALC)
CO2 SERPL-SCNC: 30 MMOL/L (ref 20–32)
CREAT SERPL-MCNC: 0.93 MG/DL (ref 0.6–1)
EGFRCR SERPLBLD CKD-EPI 2021: 64 ML/MIN/1.73M2
EOSINOPHIL # BLD AUTO: 107 CELLS/UL (ref 15–500)
EOSINOPHIL NFR BLD AUTO: 1.5 %
ERYTHROCYTE [DISTWIDTH] IN BLOOD BY AUTOMATED COUNT: 13.4 % (ref 11–15)
GLUCOSE SERPL-MCNC: 131 MG/DL (ref 65–99)
HCT VFR BLD AUTO: 37.5 % (ref 35–45)
HCV AB SERPL QL IA: NORMAL
HDLC SERPL-MCNC: 76 MG/DL
HGB BLD-MCNC: 12.4 G/DL (ref 11.7–15.5)
LDLC SERPL CALC-MCNC: 76 MG/DL (CALC)
LYMPHOCYTES # BLD AUTO: 1811 CELLS/UL (ref 850–3900)
LYMPHOCYTES NFR BLD AUTO: 25.5 %
MCH RBC QN AUTO: 30.5 PG (ref 27–33)
MCHC RBC AUTO-ENTMCNC: 33.1 G/DL (ref 32–36)
MCV RBC AUTO: 92.4 FL (ref 80–100)
MONOCYTES # BLD AUTO: 391 CELLS/UL (ref 200–950)
MONOCYTES NFR BLD AUTO: 5.5 %
NEUTROPHILS # BLD AUTO: 4750 CELLS/UL (ref 1500–7800)
NEUTROPHILS NFR BLD AUTO: 66.9 %
NONHDLC SERPL-MCNC: 101 MG/DL (CALC)
PLATELET # BLD AUTO: 337 THOUSAND/UL (ref 140–400)
PMV BLD REES-ECKER: 9.6 FL (ref 7.5–12.5)
POTASSIUM SERPL-SCNC: 3.8 MMOL/L (ref 3.5–5.3)
PROT SERPL-MCNC: 6.8 G/DL (ref 6.1–8.1)
RBC # BLD AUTO: 4.06 MILLION/UL (ref 3.8–5.1)
SODIUM SERPL-SCNC: 139 MMOL/L (ref 135–146)
TRIGL SERPL-MCNC: 152 MG/DL
TSH SERPL-ACNC: 1.59 MIU/L (ref 0.4–4.5)
WBC # BLD AUTO: 7.1 THOUSAND/UL (ref 3.8–10.8)

## 2025-08-21 PROCEDURE — 97110 THERAPEUTIC EXERCISES: CPT | Mod: GP

## 2025-08-21 PROCEDURE — 97112 NEUROMUSCULAR REEDUCATION: CPT | Mod: GP

## 2025-08-22 DIAGNOSIS — E11.65 TYPE 2 DIABETES MELLITUS WITH HYPERGLYCEMIA, WITHOUT LONG-TERM CURRENT USE OF INSULIN: ICD-10-CM

## 2025-08-22 DIAGNOSIS — E11.9 DIABETES MELLITUS WITHOUT COMPLICATION: ICD-10-CM

## 2025-08-27 ENCOUNTER — TREATMENT (OUTPATIENT)
Facility: CLINIC | Age: 75
End: 2025-08-27
Payer: MEDICARE

## 2025-08-27 DIAGNOSIS — R26.81 UNSTEADY GAIT: ICD-10-CM

## 2025-08-27 DIAGNOSIS — M54.6 CHRONIC BILATERAL THORACIC BACK PAIN: ICD-10-CM

## 2025-08-27 DIAGNOSIS — G89.29 CHRONIC BILATERAL THORACIC BACK PAIN: ICD-10-CM

## 2025-08-27 DIAGNOSIS — S22.000A COMPRESSION FRACTURE OF BODY OF THORACIC VERTEBRA (MULTI): ICD-10-CM

## 2025-08-27 PROCEDURE — 97112 NEUROMUSCULAR REEDUCATION: CPT | Mod: GP | Performed by: PHYSICAL THERAPIST

## 2025-08-27 PROCEDURE — 97110 THERAPEUTIC EXERCISES: CPT | Mod: GP | Performed by: PHYSICAL THERAPIST

## 2025-09-04 ENCOUNTER — TELEPHONE (OUTPATIENT)
Dept: HEMATOLOGY/ONCOLOGY | Facility: CLINIC | Age: 75
End: 2025-09-04
Payer: MEDICARE

## 2025-09-05 ENCOUNTER — APPOINTMENT (OUTPATIENT)
Dept: CARDIOLOGY | Facility: CLINIC | Age: 75
End: 2025-09-05
Payer: MEDICARE

## 2025-09-08 ENCOUNTER — APPOINTMENT (OUTPATIENT)
Dept: HEMATOLOGY/ONCOLOGY | Facility: CLINIC | Age: 75
End: 2025-09-08
Payer: MEDICARE

## 2025-12-11 ENCOUNTER — APPOINTMENT (OUTPATIENT)
Dept: UROLOGY | Facility: CLINIC | Age: 75
End: 2025-12-11
Payer: MEDICARE

## (undated) DEVICE — ELECTRODE, ELECTROSURGICAL, BLADE, INSULATED, ENT/IMA, STERILE

## (undated) DEVICE — SUTURE, VICRYL, 2-0, 27 IN, SH, UNDYED

## (undated) DEVICE — Device

## (undated) DEVICE — TIP, SUCTION, YANKAUER, BULB, ADULT

## (undated) DEVICE — DRAPE, LEGGINGS, 48 X 31 IN, STERILE, LF

## (undated) DEVICE — DRAPE, TAPE, ORTHO

## (undated) DEVICE — SOLUTION, IRRIGATION, STERILE WATER, 1000 ML, POUR BOTTLE

## (undated) DEVICE — BLADE, SURGICAL, POLYMER COATED P15, STERILE, DISP

## (undated) DEVICE — SUTURE, PROLENE, 2-0, 30 IN, SH, BLUE

## (undated) DEVICE — SLEEVE, VASO PRESS, CALF GARMENT, MEDIUM, GREEN

## (undated) DEVICE — WATER STERILE, FOR IRRIGATION, 1000ML, W/HANGER

## (undated) DEVICE — GLOVE, SURGICAL, PROTEXIS PI , 6.5, PF, LF

## (undated) DEVICE — SUTURE, VICRYL, 3-0, 27 IN, SH

## (undated) DEVICE — SUTURE, VICRYL, 3-0, 54 IN, CTD, UNDYED

## (undated) DEVICE — SUTURE, SILK, 2-0, 30 IN, SH, BLACK

## (undated) DEVICE — STRIP, SKIN CLOSURE, STERI STRIP, REINFORCED, 0.5 X 4 IN

## (undated) DEVICE — PROBE COVER, INTRAOPERATIVE, 13 X 244CM (5 X 96IN)

## (undated) DEVICE — SUTURE, MONOCRYL, 4-0, 27 IN, PS-2, UNDYED